# Patient Record
Sex: FEMALE | Race: WHITE | NOT HISPANIC OR LATINO | Employment: OTHER | URBAN - METROPOLITAN AREA
[De-identification: names, ages, dates, MRNs, and addresses within clinical notes are randomized per-mention and may not be internally consistent; named-entity substitution may affect disease eponyms.]

---

## 2017-02-02 PROBLEM — E66.09 NON MORBID OBESITY DUE TO EXCESS CALORIES: Status: ACTIVE | Noted: 2017-02-02

## 2017-05-02 PROBLEM — N18.2 CKD (CHRONIC KIDNEY DISEASE), STAGE II: Status: ACTIVE | Noted: 2017-05-02

## 2018-06-18 NOTE — NURSING
Total Joint Replacement Questionnaire     Lakesha Lorenzana participated in Joint Class June 9 1. Have you ever had a Joint Replacement?   [x]Yes  [] No    2. How many stairs/steps do you have to enter your home? 5  When going up, on what side is the railing?  [] Left  [] Right  [x] Bilateral  [] None    3. Do you own any Durable Medical Equipment?   [] Rolling Walker  [] Standard Walker  [] Rollator  [x] Cane  [] Crutches  [] Bed Side Commode  [] Hip Kit  [x] Tub Transfer Bench  [] Shower Chair     4. Have you used a Home Health Company before?   [] Yes  [x] No  If Yes, Name of Company: na  Would you like to use this company again?   [] Yes  [] No  [x] Would you like to use your physician's preference?  [x] Yes  [] No    5. Have you arranged for someone to help you, for at least for 3 days, when you are discharged from the hospital?  [x] Yes  [] No  If Yes, Name(s) of person assisting: family      Norma Stinson  6/18/2018

## 2018-06-19 ENCOUNTER — ANESTHESIA EVENT (OUTPATIENT)
Dept: SURGERY | Facility: OTHER | Age: 78
DRG: 470 | End: 2018-06-19
Payer: MEDICARE

## 2018-06-19 ENCOUNTER — HOSPITAL ENCOUNTER (OUTPATIENT)
Dept: PREADMISSION TESTING | Facility: OTHER | Age: 78
Discharge: HOME | End: 2018-06-19
Attending: ORTHOPAEDIC SURGERY
Payer: MEDICARE

## 2018-06-19 VITALS
OXYGEN SATURATION: 96 % | DIASTOLIC BLOOD PRESSURE: 78 MMHG | BODY MASS INDEX: 39.27 KG/M2 | WEIGHT: 230 LBS | HEIGHT: 64 IN | TEMPERATURE: 98 F | HEART RATE: 79 BPM | SYSTOLIC BLOOD PRESSURE: 174 MMHG

## 2018-06-19 DIAGNOSIS — M16.12 PRIMARY OSTEOARTHRITIS OF LEFT HIP: Primary | ICD-10-CM

## 2018-06-19 LAB
ABO + RH BLD: NORMAL
ALBUMIN SERPL BCP-MCNC: 4 G/DL
ALP SERPL-CCNC: 59 U/L
ALT SERPL W/O P-5'-P-CCNC: 12 U/L
ANION GAP SERPL CALC-SCNC: 15 MMOL/L
AST SERPL-CCNC: 12 U/L
BASOPHILS # BLD AUTO: 0.03 K/UL
BASOPHILS NFR BLD: 0.3 %
BILIRUB SERPL-MCNC: 0.4 MG/DL
BILIRUB UR QL STRIP: NEGATIVE
BLD GP AB SCN CELLS X3 SERPL QL: NORMAL
BUN SERPL-MCNC: 32 MG/DL
CALCIUM SERPL-MCNC: 10.7 MG/DL
CHLORIDE SERPL-SCNC: 101 MMOL/L
CLARITY UR: CLEAR
CO2 SERPL-SCNC: 25 MMOL/L
COLOR UR: YELLOW
CREAT SERPL-MCNC: 0.9 MG/DL
DIFFERENTIAL METHOD: NORMAL
EOSINOPHIL # BLD AUTO: 0.1 K/UL
EOSINOPHIL NFR BLD: 0.5 %
ERYTHROCYTE [DISTWIDTH] IN BLOOD BY AUTOMATED COUNT: 14.5 %
EST. GFR  (AFRICAN AMERICAN): >60 ML/MIN/1.73 M^2
EST. GFR  (NON AFRICAN AMERICAN): >60 ML/MIN/1.73 M^2
GLUCOSE SERPL-MCNC: 65 MG/DL
GLUCOSE UR QL STRIP: NEGATIVE
HCT VFR BLD AUTO: 39.6 %
HGB BLD-MCNC: 12.8 G/DL
HGB UR QL STRIP: NEGATIVE
KETONES UR QL STRIP: ABNORMAL
LEUKOCYTE ESTERASE UR QL STRIP: NEGATIVE
LYMPHOCYTES # BLD AUTO: 2.6 K/UL
LYMPHOCYTES NFR BLD: 23.8 %
MCH RBC QN AUTO: 30.3 PG
MCHC RBC AUTO-ENTMCNC: 32.3 G/DL
MCV RBC AUTO: 94 FL
MONOCYTES # BLD AUTO: 0.7 K/UL
MONOCYTES NFR BLD: 5.9 %
NEUTROPHILS # BLD AUTO: 7.6 K/UL
NEUTROPHILS NFR BLD: 69.3 %
NITRITE UR QL STRIP: NEGATIVE
PH UR STRIP: 6 [PH] (ref 5–8)
PLATELET # BLD AUTO: 269 K/UL
PMV BLD AUTO: 10.7 FL
POTASSIUM SERPL-SCNC: 4.1 MMOL/L
PROT SERPL-MCNC: 7.4 G/DL
PROT UR QL STRIP: ABNORMAL
RBC # BLD AUTO: 4.22 M/UL
SODIUM SERPL-SCNC: 141 MMOL/L
SP GR UR STRIP: >=1.03 (ref 1–1.03)
URN SPEC COLLECT METH UR: ABNORMAL
UROBILINOGEN UR STRIP-ACNC: NEGATIVE EU/DL
WBC # BLD AUTO: 11 K/UL

## 2018-06-19 PROCEDURE — 87086 URINE CULTURE/COLONY COUNT: CPT

## 2018-06-19 PROCEDURE — 86850 RBC ANTIBODY SCREEN: CPT

## 2018-06-19 PROCEDURE — 85025 COMPLETE CBC W/AUTO DIFF WBC: CPT

## 2018-06-19 PROCEDURE — 93005 ELECTROCARDIOGRAM TRACING: CPT

## 2018-06-19 PROCEDURE — 36415 COLL VENOUS BLD VENIPUNCTURE: CPT

## 2018-06-19 PROCEDURE — 93010 ELECTROCARDIOGRAM REPORT: CPT | Mod: ,,, | Performed by: INTERNAL MEDICINE

## 2018-06-19 PROCEDURE — 80053 COMPREHEN METABOLIC PANEL: CPT

## 2018-06-19 PROCEDURE — 81003 URINALYSIS AUTO W/O SCOPE: CPT

## 2018-06-19 RX ORDER — SODIUM CHLORIDE, SODIUM LACTATE, POTASSIUM CHLORIDE, CALCIUM CHLORIDE 600; 310; 30; 20 MG/100ML; MG/100ML; MG/100ML; MG/100ML
INJECTION, SOLUTION INTRAVENOUS CONTINUOUS
Status: CANCELLED | OUTPATIENT
Start: 2018-06-19

## 2018-06-19 RX ORDER — LIDOCAINE HYDROCHLORIDE 10 MG/ML
0.5 INJECTION, SOLUTION EPIDURAL; INFILTRATION; INTRACAUDAL; PERINEURAL ONCE
Status: CANCELLED | OUTPATIENT
Start: 2018-06-19 | End: 2018-06-19

## 2018-06-19 RX ORDER — PREGABALIN 75 MG/1
75 CAPSULE ORAL ONCE
Status: CANCELLED | OUTPATIENT
Start: 2018-06-19 | End: 2018-06-19

## 2018-06-19 RX ORDER — METFORMIN HYDROCHLORIDE 1000 MG/1
1000 TABLET ORAL 2 TIMES DAILY WITH MEALS
COMMUNITY

## 2018-06-19 NOTE — ANESTHESIA PREPROCEDURE EVALUATION
06/19/2018  Lakesha Lorenzana is a 77 y.o., female.    Anesthesia Evaluation    I have reviewed the Patient Summary Reports.    I have reviewed the Nursing Notes.   I have reviewed the Medications.     Review of Systems  Anesthesia Hx:  No problems with previous Anesthesia  Denies Family Hx of Anesthesia complications.   Denies Personal Hx of Anesthesia complications.   Social:  Non-Smoker    Cardiovascular:   Hypertension ECG has been reviewed. NSR,LVH on EKG   Renal/:   Chronic Renal Disease    Musculoskeletal:   Arthritis     Endocrine:   Diabetes        Physical Exam  General:  Well nourished      Dental:  Dental Findings: upper partial dentures        Mental Status:  Mental Status Findings:  Cooperative, Alert and Oriented         Anesthesia Plan  Type of Anesthesia, risks & benefits discussed:  Anesthesia Type:  spinal  Patient's Preference:   Intra-op Monitoring Plan: standard ASA monitors  Intra-op Monitoring Plan Comments:   Post Op Pain Control Plan: multimodal analgesia  Post Op Pain Control Plan Comments:   Induction:   IV  Beta Blocker:         Informed Consent: Patient understands risks and agrees with Anesthesia plan.  Questions answered. Anesthesia consent signed with patient.  ASA Score: 3     Day of Surgery Review of History & Physical:    H&P update referred to the surgeon.     Anesthesia Plan Notes: Pt doesn't want to hear anything        Ready For Surgery From Anesthesia Perspective.

## 2018-06-19 NOTE — DISCHARGE INSTRUCTIONS
PRE-ADMIT TESTING -  [phone]    [address]  [facility]          Your surgery has been scheduled at [facility]. We are pleased to have the opportunity to serve you. For Further Information please call [phone].    On the day of surgery please report to the Information Desk on the 1st floor.    · CONTACT YOUR PHYSICIAN'S OFFICE THE DAY PRIOR TO YOUR SURGERY TO OBTAIN YOUR ARRIVAL TIME.     · The evening before surgery do not eat anything after 9 p.m. ( this includes hard candy, chewing gum and mints).  You may only have GATORADE, POWERADE AND WATER  from 9 p.m. until you leave your home.   DO NOT DRINK ANY LIQUIDS ON THE WAY TO THE HOSPITAL.      SPECIAL MEDICATION INSTRUCTIONS: TAKE medications checked off by the Anesthesiologist on your Medication List.    Angiogram Patients: Take medications as instructed by your physician, including aspirin.     Surgery Patients:    If you take ASPIRIN - Your PHYSICIAN/SURGEON will need to inform you IF/OR when you need to stop taking aspirin prior to your surgery.     Do Not take any medications containing IBUPROFEN.  Do Not Wear any make-up or dark nail polish   (especially eye make-up) to surgery. If you come to surgery with makeup on you will be required to remove the makeup or nail polish.    Do not shave your surgical area at least 5 days prior to your surgery. The surgical prep will be performed at the hospital according to Infection Control regulations.    Leave all valuables at home.   Do Not wear any jewelry or watches, including any metal in body piercings.  Contact Lens must be removed before surgery. Either do not wear the contact lens or bring a case and solution for storage.  Please bring a container for eyeglasses or dentures as required.  Bring any paperwork your physician has provided, such as consent forms,  history and physicals, doctor's orders, etc.   Bring comfortable clothes that are loose fitting to wear upon  discharge. Take into consideration the type of surgery being performed.  Maintain your diet as advised per your physician the day prior to surgery.      Adequate rest the night before surgery is advised.   Park in the Parking lot behind the hospital or in the [facility] across the street from the parking lot. Parking is complimentary.  If you will be discharged the same day as your procedure, please arrange for a responsible adult to drive you home or to accompany you if traveling by taxi.   YOU WILL NOT BE PERMITTED TO DRIVE OR TO LEAVE THE HOSPITAL ALONE AFTER SURGERY.   It is strongly recommended that you arrange for someone to remain with you for the first 24 hrs following your surgery.       Thank you for your cooperation.  The Staff of [facility].        Bathing Instructions                                                                 Please shower the evening before and morning of your procedure with    ANTIBACTERIAL SOAP. ( DIAL, etc )  Concentrate on the surgical area   for at least 3 minutes and rinse completely. Dry off as usual.   Do not use any deodorant, powder, body lotions, perfume, after shave or    cologne.

## 2018-06-20 LAB
BACTERIA UR CULT: NORMAL
BACTERIA UR CULT: NORMAL

## 2018-06-29 ENCOUNTER — ANESTHESIA (OUTPATIENT)
Dept: SURGERY | Facility: OTHER | Age: 78
DRG: 470 | End: 2018-06-29
Payer: MEDICARE

## 2018-06-29 ENCOUNTER — HOSPITAL ENCOUNTER (INPATIENT)
Facility: OTHER | Age: 78
LOS: 2 days | Discharge: HOME HEALTH | DRG: 470 | End: 2018-07-01
Attending: ORTHOPAEDIC SURGERY | Admitting: ORTHOPAEDIC SURGERY
Payer: MEDICARE

## 2018-06-29 DIAGNOSIS — M16.12 PRIMARY OSTEOARTHRITIS OF LEFT HIP: Primary | ICD-10-CM

## 2018-06-29 DIAGNOSIS — M16.12 PRIMARY LOCALIZED OSTEOARTHROSIS OF LEFT HIP: ICD-10-CM

## 2018-06-29 LAB
ESTIMATED AVG GLUCOSE: 114 MG/DL
HBA1C MFR BLD HPLC: 5.6 %
POCT GLUCOSE: 108 MG/DL (ref 70–110)
POCT GLUCOSE: 135 MG/DL (ref 70–110)
POCT GLUCOSE: 218 MG/DL (ref 70–110)

## 2018-06-29 PROCEDURE — 25000003 PHARM REV CODE 250: Performed by: ANESTHESIOLOGY

## 2018-06-29 PROCEDURE — 27201423 OPTIME MED/SURG SUP & DEVICES STERILE SUPPLY: Performed by: ORTHOPAEDIC SURGERY

## 2018-06-29 PROCEDURE — 36000711: Performed by: ORTHOPAEDIC SURGERY

## 2018-06-29 PROCEDURE — 36000710: Performed by: ORTHOPAEDIC SURGERY

## 2018-06-29 PROCEDURE — 71000039 HC RECOVERY, EACH ADD'L HOUR: Performed by: ORTHOPAEDIC SURGERY

## 2018-06-29 PROCEDURE — 63600175 PHARM REV CODE 636 W HCPCS: Performed by: ORTHOPAEDIC SURGERY

## 2018-06-29 PROCEDURE — 99900035 HC TECH TIME PER 15 MIN (STAT)

## 2018-06-29 PROCEDURE — 36415 COLL VENOUS BLD VENIPUNCTURE: CPT

## 2018-06-29 PROCEDURE — 97530 THERAPEUTIC ACTIVITIES: CPT

## 2018-06-29 PROCEDURE — 97161 PT EVAL LOW COMPLEX 20 MIN: CPT

## 2018-06-29 PROCEDURE — 63600175 PHARM REV CODE 636 W HCPCS: Performed by: NURSE ANESTHETIST, CERTIFIED REGISTERED

## 2018-06-29 PROCEDURE — 71000033 HC RECOVERY, INTIAL HOUR: Performed by: ORTHOPAEDIC SURGERY

## 2018-06-29 PROCEDURE — 94799 UNLISTED PULMONARY SVC/PX: CPT

## 2018-06-29 PROCEDURE — 25000003 PHARM REV CODE 250: Performed by: ORTHOPAEDIC SURGERY

## 2018-06-29 PROCEDURE — 83036 HEMOGLOBIN GLYCOSYLATED A1C: CPT

## 2018-06-29 PROCEDURE — 37000009 HC ANESTHESIA EA ADD 15 MINS: Performed by: ORTHOPAEDIC SURGERY

## 2018-06-29 PROCEDURE — 97116 GAIT TRAINING THERAPY: CPT

## 2018-06-29 PROCEDURE — 25000003 PHARM REV CODE 250: Performed by: NURSE PRACTITIONER

## 2018-06-29 PROCEDURE — 25000003 PHARM REV CODE 250: Performed by: NURSE ANESTHETIST, CERTIFIED REGISTERED

## 2018-06-29 PROCEDURE — 82962 GLUCOSE BLOOD TEST: CPT | Performed by: ORTHOPAEDIC SURGERY

## 2018-06-29 PROCEDURE — 63600175 PHARM REV CODE 636 W HCPCS: Performed by: SPECIALIST

## 2018-06-29 PROCEDURE — C1713 ANCHOR/SCREW BN/BN,TIS/BN: HCPCS | Performed by: ORTHOPAEDIC SURGERY

## 2018-06-29 PROCEDURE — 37000008 HC ANESTHESIA 1ST 15 MINUTES: Performed by: ORTHOPAEDIC SURGERY

## 2018-06-29 PROCEDURE — 94761 N-INVAS EAR/PLS OXIMETRY MLT: CPT

## 2018-06-29 PROCEDURE — 11000001 HC ACUTE MED/SURG PRIVATE ROOM

## 2018-06-29 PROCEDURE — G8979 MOBILITY GOAL STATUS: HCPCS | Mod: CL

## 2018-06-29 PROCEDURE — S0020 INJECTION, BUPIVICAINE HYDRO: HCPCS | Performed by: ORTHOPAEDIC SURGERY

## 2018-06-29 PROCEDURE — C1776 JOINT DEVICE (IMPLANTABLE): HCPCS | Performed by: ORTHOPAEDIC SURGERY

## 2018-06-29 PROCEDURE — 0SRB02A REPLACEMENT OF LEFT HIP JOINT WITH METAL ON POLYETHYLENE SYNTHETIC SUBSTITUTE, UNCEMENTED, OPEN APPROACH: ICD-10-PCS | Performed by: ORTHOPAEDIC SURGERY

## 2018-06-29 PROCEDURE — G8978 MOBILITY CURRENT STATUS: HCPCS | Mod: CM

## 2018-06-29 PROCEDURE — C9290 INJ, BUPIVACAINE LIPOSOME: HCPCS | Performed by: ORTHOPAEDIC SURGERY

## 2018-06-29 DEVICE — HEAD FEMORAL 36MM: Type: IMPLANTABLE DEVICE | Site: HIP | Status: FUNCTIONAL

## 2018-06-29 DEVICE — LINER POLY 36MM: Type: IMPLANTABLE DEVICE | Site: HIP | Status: FUNCTIONAL

## 2018-06-29 DEVICE — STEM FEM PRIMARY HIP SYSTEM: Type: IMPLANTABLE DEVICE | Site: HIP | Status: FUNCTIONAL

## 2018-06-29 DEVICE — SCREW BONE 6.5X30 SELF-TAP: Type: IMPLANTABLE DEVICE | Site: HIP | Status: FUNCTIONAL

## 2018-06-29 DEVICE — SCREW BONE SELF TAP 6.5X40: Type: IMPLANTABLE DEVICE | Site: HIP | Status: FUNCTIONAL

## 2018-06-29 DEVICE — CUP SHELL TM MOD W/CLUST 50MM: Type: IMPLANTABLE DEVICE | Site: HIP | Status: FUNCTIONAL

## 2018-06-29 RX ORDER — HYDROMORPHONE HYDROCHLORIDE 2 MG/ML
0.5 INJECTION, SOLUTION INTRAMUSCULAR; INTRAVENOUS; SUBCUTANEOUS EVERY 4 HOURS PRN
Status: DISCONTINUED | OUTPATIENT
Start: 2018-06-29 | End: 2018-07-01 | Stop reason: HOSPADM

## 2018-06-29 RX ORDER — SODIUM CHLORIDE, SODIUM LACTATE, POTASSIUM CHLORIDE, CALCIUM CHLORIDE 600; 310; 30; 20 MG/100ML; MG/100ML; MG/100ML; MG/100ML
INJECTION, SOLUTION INTRAVENOUS CONTINUOUS
Status: DISCONTINUED | OUTPATIENT
Start: 2018-06-29 | End: 2018-06-29

## 2018-06-29 RX ORDER — OXYCODONE HYDROCHLORIDE 5 MG/1
5 TABLET ORAL
Status: DISCONTINUED | OUTPATIENT
Start: 2018-06-29 | End: 2018-06-29 | Stop reason: HOSPADM

## 2018-06-29 RX ORDER — MEPERIDINE HYDROCHLORIDE 50 MG/ML
12.5 INJECTION INTRAMUSCULAR; INTRAVENOUS; SUBCUTANEOUS ONCE AS NEEDED
Status: DISCONTINUED | OUTPATIENT
Start: 2018-06-29 | End: 2018-06-29 | Stop reason: HOSPADM

## 2018-06-29 RX ORDER — EPHEDRINE SULFATE 50 MG/ML
INJECTION, SOLUTION INTRAVENOUS
Status: DISCONTINUED | OUTPATIENT
Start: 2018-06-29 | End: 2018-06-29

## 2018-06-29 RX ORDER — ONDANSETRON 2 MG/ML
INJECTION INTRAMUSCULAR; INTRAVENOUS
Status: DISCONTINUED | OUTPATIENT
Start: 2018-06-29 | End: 2018-06-29

## 2018-06-29 RX ORDER — FAMOTIDINE 20 MG/1
20 TABLET, FILM COATED ORAL 2 TIMES DAILY
Status: DISCONTINUED | OUTPATIENT
Start: 2018-06-29 | End: 2018-07-01 | Stop reason: HOSPADM

## 2018-06-29 RX ORDER — LIDOCAINE HYDROCHLORIDE 10 MG/ML
0.5 INJECTION, SOLUTION EPIDURAL; INFILTRATION; INTRACAUDAL; PERINEURAL ONCE
Status: DISCONTINUED | OUTPATIENT
Start: 2018-06-29 | End: 2018-06-29 | Stop reason: HOSPADM

## 2018-06-29 RX ORDER — DIPHENHYDRAMINE HYDROCHLORIDE 50 MG/ML
25 INJECTION INTRAMUSCULAR; INTRAVENOUS EVERY 6 HOURS PRN
Status: DISCONTINUED | OUTPATIENT
Start: 2018-06-29 | End: 2018-07-01 | Stop reason: HOSPADM

## 2018-06-29 RX ORDER — IBUPROFEN 200 MG
24 TABLET ORAL
Status: DISCONTINUED | OUTPATIENT
Start: 2018-06-29 | End: 2018-07-01 | Stop reason: HOSPADM

## 2018-06-29 RX ORDER — CELECOXIB 200 MG/1
200 CAPSULE ORAL 2 TIMES DAILY
Status: DISCONTINUED | OUTPATIENT
Start: 2018-06-29 | End: 2018-06-29

## 2018-06-29 RX ORDER — BISACODYL 10 MG
10 SUPPOSITORY, RECTAL RECTAL DAILY PRN
Status: DISCONTINUED | OUTPATIENT
Start: 2018-06-29 | End: 2018-07-01 | Stop reason: HOSPADM

## 2018-06-29 RX ORDER — MIDAZOLAM HYDROCHLORIDE 1 MG/ML
INJECTION INTRAMUSCULAR; INTRAVENOUS
Status: DISCONTINUED | OUTPATIENT
Start: 2018-06-29 | End: 2018-06-29

## 2018-06-29 RX ORDER — KETOROLAC TROMETHAMINE 30 MG/ML
INJECTION, SOLUTION INTRAMUSCULAR; INTRAVENOUS
Status: DISCONTINUED | OUTPATIENT
Start: 2018-06-29 | End: 2018-06-29 | Stop reason: HOSPADM

## 2018-06-29 RX ORDER — AMLODIPINE BESYLATE 5 MG/1
10 TABLET ORAL DAILY
Status: DISCONTINUED | OUTPATIENT
Start: 2018-06-29 | End: 2018-07-01 | Stop reason: HOSPADM

## 2018-06-29 RX ORDER — GLUCAGON 1 MG
1 KIT INJECTION
Status: DISCONTINUED | OUTPATIENT
Start: 2018-06-29 | End: 2018-07-01 | Stop reason: HOSPADM

## 2018-06-29 RX ORDER — OXYCODONE AND ACETAMINOPHEN 5; 325 MG/1; MG/1
TABLET ORAL
Qty: 60 TABLET | Refills: 0 | Status: SHIPPED | OUTPATIENT
Start: 2018-06-29

## 2018-06-29 RX ORDER — TRANEXAMIC ACID 100 MG/ML
INJECTION, SOLUTION INTRAVENOUS
Status: DISCONTINUED | OUTPATIENT
Start: 2018-06-29 | End: 2018-06-29

## 2018-06-29 RX ORDER — FENTANYL CITRATE 50 UG/ML
25 INJECTION, SOLUTION INTRAMUSCULAR; INTRAVENOUS EVERY 5 MIN PRN
Status: DISCONTINUED | OUTPATIENT
Start: 2018-06-29 | End: 2018-06-29 | Stop reason: HOSPADM

## 2018-06-29 RX ORDER — ASPIRIN 325 MG
325 TABLET ORAL EVERY 12 HOURS
Status: DISCONTINUED | OUTPATIENT
Start: 2018-06-30 | End: 2018-07-01 | Stop reason: HOSPADM

## 2018-06-29 RX ORDER — BUPIVACAINE HYDROCHLORIDE 2.5 MG/ML
INJECTION, SOLUTION INFILTRATION; PERINEURAL
Status: DISCONTINUED | OUTPATIENT
Start: 2018-06-29 | End: 2018-06-29 | Stop reason: HOSPADM

## 2018-06-29 RX ORDER — ATORVASTATIN CALCIUM 10 MG/1
10 TABLET, FILM COATED ORAL DAILY
Status: DISCONTINUED | OUTPATIENT
Start: 2018-06-29 | End: 2018-07-01 | Stop reason: HOSPADM

## 2018-06-29 RX ORDER — SODIUM CHLORIDE 0.9 % (FLUSH) 0.9 %
5 SYRINGE (ML) INJECTION
Status: DISCONTINUED | OUTPATIENT
Start: 2018-06-29 | End: 2018-07-01 | Stop reason: HOSPADM

## 2018-06-29 RX ORDER — OXYCODONE HYDROCHLORIDE 5 MG/1
5 TABLET ORAL EVERY 4 HOURS PRN
Status: DISCONTINUED | OUTPATIENT
Start: 2018-06-29 | End: 2018-07-01 | Stop reason: HOSPADM

## 2018-06-29 RX ORDER — PREGABALIN 75 MG/1
75 CAPSULE ORAL ONCE
Status: COMPLETED | OUTPATIENT
Start: 2018-06-29 | End: 2018-06-29

## 2018-06-29 RX ORDER — BACITRACIN 50000 [IU]/1
INJECTION, POWDER, FOR SOLUTION INTRAMUSCULAR
Status: DISCONTINUED | OUTPATIENT
Start: 2018-06-29 | End: 2018-06-29 | Stop reason: HOSPADM

## 2018-06-29 RX ORDER — CEFAZOLIN SODIUM 1 G/3ML
2 INJECTION, POWDER, FOR SOLUTION INTRAMUSCULAR; INTRAVENOUS
Status: COMPLETED | OUTPATIENT
Start: 2018-06-29 | End: 2018-06-29

## 2018-06-29 RX ORDER — DIPHENHYDRAMINE HYDROCHLORIDE 50 MG/ML
25 INJECTION INTRAMUSCULAR; INTRAVENOUS EVERY 6 HOURS PRN
Status: DISCONTINUED | OUTPATIENT
Start: 2018-06-29 | End: 2018-06-29 | Stop reason: HOSPADM

## 2018-06-29 RX ORDER — MUPIROCIN 20 MG/G
1 OINTMENT TOPICAL 2 TIMES DAILY
Status: DISCONTINUED | OUTPATIENT
Start: 2018-06-29 | End: 2018-07-01 | Stop reason: HOSPADM

## 2018-06-29 RX ORDER — ONDANSETRON 2 MG/ML
4 INJECTION INTRAMUSCULAR; INTRAVENOUS DAILY PRN
Status: DISCONTINUED | OUTPATIENT
Start: 2018-06-29 | End: 2018-06-29 | Stop reason: HOSPADM

## 2018-06-29 RX ORDER — SODIUM CHLORIDE, SODIUM LACTATE, POTASSIUM CHLORIDE, CALCIUM CHLORIDE 600; 310; 30; 20 MG/100ML; MG/100ML; MG/100ML; MG/100ML
INJECTION, SOLUTION INTRAVENOUS CONTINUOUS PRN
Status: DISCONTINUED | OUTPATIENT
Start: 2018-06-29 | End: 2018-06-29

## 2018-06-29 RX ORDER — HYDROMORPHONE HYDROCHLORIDE 2 MG/ML
INJECTION, SOLUTION INTRAMUSCULAR; INTRAVENOUS; SUBCUTANEOUS
Status: DISPENSED
Start: 2018-06-29 | End: 2018-06-29

## 2018-06-29 RX ORDER — ACETAMINOPHEN 10 MG/ML
1000 INJECTION, SOLUTION INTRAVENOUS EVERY 8 HOURS
Status: COMPLETED | OUTPATIENT
Start: 2018-06-29 | End: 2018-06-30

## 2018-06-29 RX ORDER — HYDROMORPHONE HYDROCHLORIDE 2 MG/ML
0.4 INJECTION, SOLUTION INTRAMUSCULAR; INTRAVENOUS; SUBCUTANEOUS EVERY 5 MIN PRN
Status: DISCONTINUED | OUTPATIENT
Start: 2018-06-29 | End: 2018-06-29 | Stop reason: HOSPADM

## 2018-06-29 RX ORDER — PROPOFOL 10 MG/ML
VIAL (ML) INTRAVENOUS CONTINUOUS PRN
Status: DISCONTINUED | OUTPATIENT
Start: 2018-06-29 | End: 2018-06-29

## 2018-06-29 RX ORDER — INSULIN ASPART 100 [IU]/ML
1-10 INJECTION, SOLUTION INTRAVENOUS; SUBCUTANEOUS
Status: DISCONTINUED | OUTPATIENT
Start: 2018-06-29 | End: 2018-07-01 | Stop reason: HOSPADM

## 2018-06-29 RX ORDER — IBUPROFEN 200 MG
16 TABLET ORAL
Status: DISCONTINUED | OUTPATIENT
Start: 2018-06-29 | End: 2018-07-01 | Stop reason: HOSPADM

## 2018-06-29 RX ORDER — ACETAMINOPHEN 10 MG/ML
1000 INJECTION, SOLUTION INTRAVENOUS
Status: COMPLETED | OUTPATIENT
Start: 2018-06-29 | End: 2018-06-29

## 2018-06-29 RX ORDER — DEXTROSE MONOHYDRATE AND SODIUM CHLORIDE 5; .9 G/100ML; G/100ML
INJECTION, SOLUTION INTRAVENOUS CONTINUOUS
Status: DISCONTINUED | OUTPATIENT
Start: 2018-06-29 | End: 2018-06-29

## 2018-06-29 RX ORDER — ONDANSETRON 2 MG/ML
4 INJECTION INTRAMUSCULAR; INTRAVENOUS EVERY 12 HOURS PRN
Status: DISCONTINUED | OUTPATIENT
Start: 2018-06-29 | End: 2018-07-01 | Stop reason: HOSPADM

## 2018-06-29 RX ORDER — LIDOCAINE HCL/PF 100 MG/5ML
SYRINGE (ML) INTRAVENOUS
Status: DISCONTINUED | OUTPATIENT
Start: 2018-06-29 | End: 2018-06-29

## 2018-06-29 RX ORDER — ROPIVACAINE HYDROCHLORIDE 5 MG/ML
INJECTION, SOLUTION EPIDURAL; INFILTRATION; PERINEURAL
Status: DISCONTINUED | OUTPATIENT
Start: 2018-06-29 | End: 2018-06-29

## 2018-06-29 RX ORDER — SODIUM CHLORIDE 0.9 % (FLUSH) 0.9 %
3 SYRINGE (ML) INJECTION
Status: DISCONTINUED | OUTPATIENT
Start: 2018-06-29 | End: 2018-07-01 | Stop reason: HOSPADM

## 2018-06-29 RX ORDER — DOXAZOSIN 2 MG/1
2 TABLET ORAL DAILY
Status: DISCONTINUED | OUTPATIENT
Start: 2018-06-29 | End: 2018-07-01 | Stop reason: HOSPADM

## 2018-06-29 RX ORDER — CEFAZOLIN SODIUM 2 G/50ML
2 SOLUTION INTRAVENOUS
Status: COMPLETED | OUTPATIENT
Start: 2018-06-29 | End: 2018-06-30

## 2018-06-29 RX ORDER — POLYETHYLENE GLYCOL 3350 17 G/17G
17 POWDER, FOR SOLUTION ORAL DAILY
Status: DISCONTINUED | OUTPATIENT
Start: 2018-06-29 | End: 2018-07-01 | Stop reason: HOSPADM

## 2018-06-29 RX ORDER — VANCOMYCIN HYDROCHLORIDE
15
Status: COMPLETED | OUTPATIENT
Start: 2018-06-29 | End: 2018-06-29

## 2018-06-29 RX ORDER — DOCUSATE SODIUM 100 MG/1
100 CAPSULE, LIQUID FILLED ORAL EVERY 12 HOURS
Status: DISCONTINUED | OUTPATIENT
Start: 2018-06-29 | End: 2018-07-01 | Stop reason: HOSPADM

## 2018-06-29 RX ORDER — OXYCODONE HYDROCHLORIDE 5 MG/1
10 TABLET ORAL EVERY 4 HOURS PRN
Status: DISCONTINUED | OUTPATIENT
Start: 2018-06-29 | End: 2018-07-01 | Stop reason: HOSPADM

## 2018-06-29 RX ADMIN — DOXAZOSIN MESYLATE 2 MG: 2 TABLET ORAL at 04:06

## 2018-06-29 RX ADMIN — ONDANSETRON 4 MG: 2 INJECTION INTRAMUSCULAR; INTRAVENOUS at 08:06

## 2018-06-29 RX ADMIN — ONDANSETRON HYDROCHLORIDE 4 MG: 2 INJECTION, SOLUTION INTRAMUSCULAR; INTRAVENOUS at 01:06

## 2018-06-29 RX ADMIN — ACETAMINOPHEN 1000 MG: 10 INJECTION, SOLUTION INTRAVENOUS at 04:06

## 2018-06-29 RX ADMIN — EPHEDRINE SULFATE 10 MG: 50 INJECTION INTRAMUSCULAR; INTRAVENOUS; SUBCUTANEOUS at 07:06

## 2018-06-29 RX ADMIN — DOCUSATE SODIUM 100 MG: 100 CAPSULE, LIQUID FILLED ORAL at 09:06

## 2018-06-29 RX ADMIN — ACETAMINOPHEN 1000 MG: 10 INJECTION, SOLUTION INTRAVENOUS at 09:06

## 2018-06-29 RX ADMIN — SODIUM CHLORIDE, SODIUM LACTATE, POTASSIUM CHLORIDE, AND CALCIUM CHLORIDE: 600; 310; 30; 20 INJECTION, SOLUTION INTRAVENOUS at 06:06

## 2018-06-29 RX ADMIN — DOCUSATE SODIUM 100 MG: 100 CAPSULE, LIQUID FILLED ORAL at 11:06

## 2018-06-29 RX ADMIN — Medication 1500 MG: at 05:06

## 2018-06-29 RX ADMIN — MUPIROCIN 1 G: 20 OINTMENT TOPICAL at 09:06

## 2018-06-29 RX ADMIN — PREGABALIN 75 MG: 75 CAPSULE ORAL at 05:06

## 2018-06-29 RX ADMIN — PROPOFOL 50 MCG/KG/MIN: 10 INJECTION, EMULSION INTRAVENOUS at 07:06

## 2018-06-29 RX ADMIN — MUPIROCIN 1 G: 20 OINTMENT TOPICAL at 11:06

## 2018-06-29 RX ADMIN — FAMOTIDINE 20 MG: 20 TABLET ORAL at 11:06

## 2018-06-29 RX ADMIN — TRANEXAMIC ACID 2000 MG: 100 INJECTION, SOLUTION INTRAVENOUS at 07:06

## 2018-06-29 RX ADMIN — OXYCODONE HYDROCHLORIDE 10 MG: 5 TABLET ORAL at 09:06

## 2018-06-29 RX ADMIN — CEFAZOLIN 2 G: 330 INJECTION, POWDER, FOR SOLUTION INTRAMUSCULAR; INTRAVENOUS at 07:06

## 2018-06-29 RX ADMIN — ROPIVACAINE HYDROCHLORIDE 3 ML: 5 INJECTION, SOLUTION EPIDURAL; INFILTRATION; PERINEURAL at 06:06

## 2018-06-29 RX ADMIN — OXYCODONE HYDROCHLORIDE 5 MG: 5 TABLET ORAL at 01:06

## 2018-06-29 RX ADMIN — LIDOCAINE HYDROCHLORIDE 50 MG: 20 INJECTION, SOLUTION INTRAVENOUS at 07:06

## 2018-06-29 RX ADMIN — VANCOMYCIN HYDROCHLORIDE 1500 MG: 1 INJECTION, POWDER, LYOPHILIZED, FOR SOLUTION INTRAVENOUS at 06:06

## 2018-06-29 RX ADMIN — FAMOTIDINE 20 MG: 20 TABLET ORAL at 09:06

## 2018-06-29 RX ADMIN — SODIUM CHLORIDE, SODIUM LACTATE, POTASSIUM CHLORIDE, AND CALCIUM CHLORIDE: 600; 310; 30; 20 INJECTION, SOLUTION INTRAVENOUS at 07:06

## 2018-06-29 RX ADMIN — POLYETHYLENE GLYCOL 3350 17 G: 17 POWDER, FOR SOLUTION ORAL at 11:06

## 2018-06-29 RX ADMIN — ACETAMINOPHEN 1000 MG: 10 INJECTION, SOLUTION INTRAVENOUS at 07:06

## 2018-06-29 RX ADMIN — ATORVASTATIN CALCIUM 10 MG: 10 TABLET, FILM COATED ORAL at 04:06

## 2018-06-29 RX ADMIN — AMLODIPINE BESYLATE 10 MG: 5 TABLET ORAL at 04:06

## 2018-06-29 RX ADMIN — MIDAZOLAM HYDROCHLORIDE 2 MG: 1 INJECTION, SOLUTION INTRAMUSCULAR; INTRAVENOUS at 06:06

## 2018-06-29 RX ADMIN — HYDROMORPHONE HYDROCHLORIDE 0.5 MG: 2 INJECTION INTRAMUSCULAR; INTRAVENOUS; SUBCUTANEOUS at 10:06

## 2018-06-29 RX ADMIN — CEFAZOLIN SODIUM 2 G: 2 SOLUTION INTRAVENOUS at 04:06

## 2018-06-29 NOTE — ANESTHESIA PROCEDURE NOTES
Spinal    Diagnosis: L Hip  Patient location during procedure: holding area  Start time: 6/29/2018 6:40 AM  Timeout: 6/29/2018 6:40 AM  End time: 6/29/2018 6:45 AM  Staffing  Anesthesiologist: MIMI MONSIVAIS  Performed: anesthesiologist   Preanesthetic Checklist  Completed: patient identified, site marked, surgical consent, pre-op evaluation, timeout performed, IV checked, risks and benefits discussed and monitors and equipment checked  Spinal Block  Patient position: sitting  Prep: ChloraPrep  Patient monitoring: heart rate, cardiac monitor and continuous pulse ox  Approach: right paramedian  Location: L3-4  Injection technique: single shot  CSF Fluid: clear free-flowing CSF  Needle  Needle type: pencil-tip   Needle gauge: 25 G  Needle length: 3.5 in  Additional Documentation: incremental injection, negative aspiration for heme and no paresthesia on injection  Needle localization: anatomical landmarks  Assessment  Sensory level: T4   Dermatomal levels determined by alcohol wipe and pinch or prick  Ease of block: easy  Patient's tolerance of the procedure: comfortable throughout block and no complaints  Medications:  Bolus administered: 3 mL of 0.5 ropivacaine  Epinephrine added: none

## 2018-06-29 NOTE — OR NURSING
Family updated on pt status and given rom #[id number], room being cleaned, will contact family when room ready.

## 2018-06-29 NOTE — PLAN OF CARE
SW met with pt at bedside to complete discharge assessment, verified PCP and uses [facility] pharmacy on [address].  Daughter will provide transportation home.  Pt will discharge to Bethany goss's home:  [address] [place] [id number].  Pt needs RW, BSC and hip kit, long and would like to be placed with [facility] and signed choice form.  Also requested light weight WC, heavy duty.     06/29/18 1509   Discharge Assessment   Assessment Type Discharge Planning Assessment   Confirmed/corrected address and phone number on facesheet? Yes   Assessment information obtained from? Patient   Communicated expected length of stay with patient/caregiver no   Prior to hospitilization cognitive status: Alert/Oriented   Prior to hospitalization functional status: Independent   Current cognitive status: Alert/Oriented   Current Functional Status: Assistive Equipment;Needs Assistance   Lives With spouse   Able to Return to Prior Arrangements yes   Is patient able to care for self after discharge? Unable to determine at this time (comments)   Who are your caregiver(s) and their phone number(s)? (Bethany, daughter, [phone])   Patient's perception of discharge disposition home health   Readmission Within The Last 30 Days no previous admission in last 30 days   Patient currently being followed by outpatient case management? No   Patient currently receives any other outside agency services? No   Equipment Currently Used at Home (transfer tub bench)   Do you have any problems affording any of your prescribed medications? No   Is the patient taking medications as prescribed? yes   Does the patient have transportation home? Yes   Transportation Available family or friend will provide   Discharge Plan A Home Health   Patient/Family In Agreement With Plan yes

## 2018-06-29 NOTE — PT/OT/SLP PROGRESS
Occupational Therapy  Not Seen    Lakesha Lorenzana   MRN: [id number]     Patient not seen for Occupational Therapy today due to ( ) departmental protocol for elective surgery patients.    Patient with Primary localized osteoarthrosis of left hip [M16.12], s/p Procedure(s):  ARTHROPLASTY, HIP TOTAL 6/29/2018 who will be seen for Occupational Therapy evaluation POD#1.    Fara Mireles, OT   6/29/2018

## 2018-06-29 NOTE — TRANSFER OF CARE
"Anesthesia Transfer of Care Note    Patient: Lakesha Lorenzana    Procedure(s) Performed: Procedure(s) (LRB):  ARTHROPLASTY, HIP TOTAL (Left)    Patient location: PACU    Anesthesia Type: spinal    Transport from OR: Transported from OR on 2-3 L/min O2 by NC with adequate spontaneous ventilation    Post pain: adequate analgesia    Post assessment: no apparent anesthetic complications    Post vital signs: stable    Level of consciousness: awake, alert and oriented    Nausea/Vomiting: no nausea/vomiting    Complications: none          Last vitals:   Visit Vitals  BP (!) 120/59 (BP Location: Right arm, Patient Position: Lying)   Pulse 75   Temp 36.3 °C (97.4 °F) (Oral)   Resp 16   Ht 5' 4" (1.626 m)   Wt 104.3 kg (230 lb)   SpO2 (!) 76%   Breastfeeding? No   BMI 39.48 kg/m²     "

## 2018-06-29 NOTE — ANESTHESIA POSTPROCEDURE EVALUATION
"Anesthesia Post Evaluation    Patient: Lakesha Lorenzana    Procedure(s) Performed: Procedure(s) (LRB):  ARTHROPLASTY, HIP TOTAL (Left)    Final Anesthesia Type: general  Patient location during evaluation: PACU  Patient participation: Yes- Able to Participate  Level of consciousness: awake and alert  Post-procedure vital signs: reviewed and stable  Pain management: adequate  Airway patency: patent  PONV status at discharge: No PONV  Anesthetic complications: no      Cardiovascular status: blood pressure returned to baseline  Respiratory status: unassisted and spontaneous ventilation  Hydration status: euvolemic  Follow-up not needed.        Visit Vitals  BP (!) 143/57   Pulse 83   Temp 36.4 °C (97.6 °F) (Oral)   Resp 16   Ht 5' 4" (1.626 m)   Wt 104.3 kg (230 lb)   SpO2 99%   Breastfeeding? No   BMI 39.48 kg/m²       Pain/Analliia Score: Pain Assessment Performed: Yes (6/29/2018 10:48 AM)  Presence of Pain: complains of pain/discomfort (6/29/2018 10:48 AM)  Pain Rating Prior to Med Admin: 5 (6/29/2018 10:48 AM)  Analilia Score: 10 (6/29/2018 10:48 AM)      "

## 2018-06-29 NOTE — PLAN OF CARE
Problem: Patient Care Overview  Goal: Plan of Care Review  Outcome: Ongoing (interventions implemented as appropriate)  Pt on RA. Sats 97%. No distress noted. IS done with good effort. Will continue to monitor.

## 2018-06-29 NOTE — CONSULTS
Consult Note  IM    Consult Requested By: Onel Hager MD  Reason for Consult: osteoarthritis, CKD 3, HTN, hyperlipidemia, DM, and PVD    SUBJECTIVE:     History of Present Illness:   77 y.o. female presents with a scheduled left hip repair. Denies CP, SOB,F,C N,V.  Daughter at bedside.    Past Medical History:   Diagnosis Date    Chronic kidney disease, stage III (moderate)     DJD (degenerative joint disease)     Dyslipidemia     Essential hypertension, benign     Obesity     PVD (peripheral vascular disease)     Type II or unspecified type diabetes mellitus without mention of complication, uncontrolled      Past Surgical History:   Procedure Laterality Date    EYE SURGERY      cataracts    glandular surgery      HYSTERECTOMY      TOTAL KNEE ARTHROPLASTY      left and right     Family History   Problem Relation Age of Onset    Diabetes Mother     Diabetes Sister     Diabetes Brother      Social History   Substance Use Topics    Smoking status: Former Smoker     Types: Cigarettes    Smokeless tobacco: Never Used    Alcohol use No       Review of patient's allergies indicates:   Allergen Reactions    Codeine     Latex, natural rubber Itching    Sulfa (sulfonamide antibiotics)         Review of Systems:  Constitutional: No fever or chills  Respiratory: No cough or shortness of breath  Cardiovascular: No chest pain or palpitations  Gastrointestinal: No nausea or vomiting  Neurological: No confusion or weakness    OBJECTIVE:     Vital Signs (Most Recent)  Temp: 98 °F (36.7 °C) (06/29/18 1118)  Pulse: 80 (06/29/18 1118)  Resp: 16 (06/29/18 1118)  BP: (!) 162/67 (06/29/18 1118)  SpO2: (!) 93 % (06/29/18 1118)    Vital Signs Range (Last 24H):  Temp:  [97.4 °F (36.3 °C)-98 °F (36.7 °C)]   Pulse:  [75-84]   Resp:  [16]   BP: (108-162)/(57-74)   SpO2:  [93 %-100 %]       Intake/Output Summary (Last 24 hours) at 06/29/18 1353  Last data filed at 06/29/18 1011   Gross per 24 hour   Intake              1700 ml   Output             1025 ml   Net              675 ml       Physical Exam:  General appearance: Well developed, well nourished  Eyes:  Conjunctivae/corneas clear. PERRL.  Lungs: Normal respiratory effort,   clear to auscultation bilaterally   Heart: Regular rate and rhythm, S1, S2 normal, no murmur, rub or luis.  Abdomen: Soft, non-tender non-distended; bowel sounds normal; no masses,  no organomegaly  Extremities: No cyanosis or clubbing. No edema.  +2 pulses BLE, abductor pillow in place  Skin: Skin color, texture, turgor normal. No rashes or lesions, Jamil dressing in place  Neurologic: Normal strength and tone. No focal numbness or weakness   Red      Laboratory:    Reviewed    Diagnostic Results:      ASSESSMENT/PLAN:     1. Left hip repair (E11.6): per therapy and ortho teams  2. PVD (I73.9): defer  3. 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 Hold parameters on BP medications  4. Hyperlipidemia (E78.5): statin  5. Vit D deficiency (E55.9): continue ergo at discharge.  6. DM T2 (E11.6): hold PO meds and use mod dose SSI  7. DVT prophy:  mg BID, MARITA and SCD per ortho    Plan: Thanks for consult, See above recommendations and orders. Will follow along.

## 2018-06-29 NOTE — OR NURSING
Left hip x-ray done @ bedside, pt tolerated well.    Daughter, Bethany, updated on pt status and wait for inpatient bed, verbalized understanding.

## 2018-06-29 NOTE — PT/OT/SLP EVAL
"Physical Therapy Evaluation/Treatment Session     Patient Name:  Lakesha Lorenzana   MRN:  [id number]    Recommendations:     Discharge Recommendations:   (TBD pending progress during acute therapy. )   Discharge Equipment Recommendations: bedside commode, bath bench   Barriers to discharge: None    Assessment:     Lakesha Lorenzana is a 77 y.o. female admitted with a medical diagnosis of Primary osteoarthritis of left hip.  She presents with the following impairments/functional limitations:  weakness, impaired endurance, impaired self care skills, impaired balance, gait instability, impaired functional mobilty, decreased lower extremity function, decreased ROM, pain, orthopedic precautions. Limited evaluation performed 2* to pt's decreased activity tolerance. Pt reported dizziness initially upon sitting EOB, which she reported worsened as she sat up. She was returned to supine with HOB elevated and nursing was notified. Post acute therapy recommendations to be decided after PM session or when pt has improved activity tolerance. Pt was able to correctly state 1/4 posterior hip precautions.     Rehab Prognosis:  good; patient would benefit from acute skilled PT services to address these deficits and reach maximum level of function.      Recent Surgery: Procedure(s) (LRB):  ARTHROPLASTY, HIP TOTAL (Left) Day of Surgery    Plan:     During this hospitalization, patient to be seen BID to address the above listed problems via gait training, therapeutic activities, therapeutic exercises  · Plan of Care Expires:  07/29/18   Plan of Care Reviewed with: patient, daughter    Subjective     Communicated with DEANDRE Peña prior to session.  Patient found supine with HOB elevated upon PT entry to room, agreeable to evaluation.      Chief Complaint: "I'm tired"   Patient comments/goals: improve pain.   Pain/Comfort:  Pain Rating 1: 5/10  Location - Side 1: Left  Location 1: groin  Pain Addressed 1: Pre-medicate for activity, " Reposition, Distraction  Pain Rating Post-Intervention 1: 8/10    Patients cultural, spiritual, Holiness conflicts given the current situation: None Verbalized to PT    Living Environment:  Pt lives with her  in a SSH with 5STE but will be discharging to her son and daughter in laws house with NSTE and a tub/shower. Pt denies any recent falls/near falls.  Prior to admission, patients level of function was independent.  Patient has the following equipment: walker, rolling.  DME owned (not currently used): none.  Upon discharge, patient will have assistance from family members.    Objective:     Patient found with: SCD (adductor wedge)     General Precautions: Standard, fall   Orthopedic Precautions:LLE weight bearing as tolerated, LLE posterior precautions   Braces: N/A     Exams:  · Cognitive Exam:  Patient is oriented to Person, Place, Time and Situation and follows 100% of all commands   · Sensation:    · -       Intact  · BLE ROM:  Unable to formally assess during limited eval 2* to decreased tolerance of the upright position.   · BLE Strength: Unable to formally assess during limited eval 2* to decreased tolerance of the upright position.     Functional Mobility:  · Bed Mobility:     · Supine to Sit: minimum assistance, assistance provided for LLE, and pt required increased time.     · Sit to Supine: minimum assistance assistance provided for LLE    AM-PAC 6 CLICK MOBILITY  Total Score:8       Therapeutic Activities and Exercises:  ·  Bed mobility as listed above.   · Pt tolerated sitting EOB for ~3 minutes before returning to supine with HOB elevated.   · While sitting pt scooted towards HOB with CGA and verbal cueing.     Patient left HOB elevated with all lines intact, call button in reach, DEANDRE Peña notified and family present.    GOALS:    Physical Therapy Goals        Problem: Physical Therapy Goal    Goal Priority Disciplines Outcome Goal Variances Interventions   Physical Therapy Goal      PT/OT, PT Ongoing (interventions implemented as appropriate)     Description:  Goals to be met by: 18     Patient will increase functional independence with mobility by performin)Sup <>sit with SBA  2) Sit <>Stand with RW and Supervision.  3) Bed <>chair with RW and Supervision.   4) Ambulate 100 feet with RW and Supervision.   5) Correctly state all posterior hip precautions independently without verbal cueing.                     History:     Past Medical History:   Diagnosis Date    Chronic kidney disease, stage III (moderate)     DJD (degenerative joint disease)     Dyslipidemia     Essential hypertension, benign     Obesity     PVD (peripheral vascular disease)     Type II or unspecified type diabetes mellitus without mention of complication, uncontrolled        Past Surgical History:   Procedure Laterality Date    EYE SURGERY      cataracts    glandular surgery      HYSTERECTOMY      TOTAL KNEE ARTHROPLASTY      left and right       Clinical Decision Making:     History  Co-morbidities and personal factors that may impact the plan of care Examination  Body Structures and Functions, activity limitations and participation restrictions that may impact the plan of care Clinical Presentation   Decision Making/ Complexity Score   Co-morbidities:   [] Time since onset of injury / illness / exacerbation  [] Status of current condition  []Patient's cognitive status and safety concerns    [] Multiple Medical Problems (see med hx)  Personal Factors:   [] Patient's age  [] Prior Level of function   [] Patient's home situation (environment and family support)  [] Patient's level of motivation  [] Expected progression of patient      HISTORY:(criteria)    [] 74697 - no personal factors/history    [] 32909 - has 1-2 personal factor/comorbidity     [] 95351 - has >3 personal factor/comorbidity     Body Regions:  [] Objective examination findings  [] Head     []  Neck  [] Trunk   [] Upper Extremity  []  Lower Extremity    Body Systems:  [] For communication ability, affect, cognition, language, and learning style: the assessment of the ability to make needs known, consciousness, orientation (person, place, and time), expected emotional /behavioral responses, and learning preferences (eg, learning barriers, education  needs)  [] For the neuromuscular system: a general assessment of gross coordinated movement (eg, balance, gait, locomotion, transfers, and transitions) and motor function  (motor control and motor learning)  [] For the musculoskeletal system: the assessment of gross symmetry, gross range of motion, gross strength, height, and weight  [] For the integumentary system: the assessment of pliability(texture), presence of scar formation, skin color, and skin integrity  [] For cardiovascular/pulmonary system: the assessment of heart rate, respiratory rate, blood pressure, and edema     Activity limitations:    [] Patient's cognitive status and saf ety concerns          [] Status of current condition      [] Weight bearing restriction  [] Cardiopulmunary Restriction    Participation Restrictions:   [] Goals and goal agreement with the patient     [] Rehab potential (prognosis) and probable outcome      Examination of Body System: (criteria)    [] 89795 - addressing 1-2 elements    [] 09182 - addressing a total of 3 or more elements     [] 99944 -  Addressing a total of 4 or more elements         Clinical Presentation: (criteria)  Choose one     On examination of body system using standardized tests and measures patient presents with (CHOOSE ONE) elements from any of the following: body structures and functions, activity limitations, and/or participation restrictions.  Leading to a clinical presentation that is considered (CHOOSE ONE)                              Clinical Decision Making  (Eval Complexity):  Choose One     Time Tracking:     PT Received On: 06/29/18  PT Start Time: 1135     PT Stop Time:  "1155  PT Total Time (min): 20 min +37 minutes =57 minutes total     Billable Minutes: Evaluation 20       John Petesron, PT, DPT  06/29/2018         PM Treatment Session:     Pt returned to pt's room for second treatment session from 6483-8082 (37 minutes). Spoke with DEANDRE Peña prior to treatment session. Pt was found supine in bed with HOB elevated and daughter in room. Pt rated pain 10/10 in left hip. Pt with significant fear that "this ball is going to pop out" PT educated pt about her precautions and WB status and pt with decreased anxiety. Pt's BP was taken in supine and was 159/67. Pt performed supine >sit with Min A for assistance with LLE and increased time. After sitting ~1 minute her BP was 140/63. Pt c/o dizziness initially upon sitting that improved. Pt sat EOB for ~5 minutes. Unsuccessful first attempt to stand; however, pt was able to perform sit >stand with Mod A and RW on second attempt. Pt stood for ~1 minute then performed ~5 sides steps left and right X2. Pt ambulated ~40 feet with RW and CGA, VC given to maintain posterior hip precautions while turning. Pt reported increased dizziness when returned to sitting position. Min A to return to supine for assistance with LLE. Pt denied dizziness at end of treatment session and rated her pain an 8/10, pt left resting in supine with HOB elevated, call button in reach and daughter in the room. Mariana CARRERA notified of pt's current assistance level and improved activity tolerance during PM treatment session.  At this time would recommend HHPT/OT upon hospital discharge.     Billable Minutes: Gait: 15 Therapeutic Activity: 22   "

## 2018-06-29 NOTE — PLAN OF CARE
Problem: Patient Care Overview  Goal: Plan of Care Review  No significant events. AAOx4. Resp even and unlabored on room air. Jamil dressing to left hip CDI. Red patent and draining yellow urine. Abductor pillow in use to BLE's. IVF's d/c'd patient is diabetic and tolerating diet. Will encourage po fluids for adequate fluid intake. Family at bedside. Bed in low locked position, side rails elevated x2. Cb in reach. Purposeful rounding done every hour

## 2018-06-29 NOTE — PLAN OF CARE
Problem: Physical Therapy Goal  Goal: Physical Therapy Goal  Goals to be met by: 18     Patient will increase functional independence with mobility by performin)Sup <>sit with SBA  2) Sit <>Stand with RW and Supervision.  3) Bed <>chair with RW and Supervision.   4) Ambulate 100 feet with RW and Supervision.   5) Correctly state all posterior hip precautions independently without verbal cueing.   Outcome: Ongoing (interventions implemented as appropriate)  PT orders received, initial evaluation complete PT to follow.

## 2018-06-29 NOTE — OP NOTE
[facility]  Operative Report    SUMMARY     Surgery Date: 6/29/2018     Surgeon(s) and Role:     * Onel Hager MD - Primary    Assistant: RANDY Sharma FA    Pre-op Diagnosis:  Primary localized osteoarthrosis of left hip [M16.12]    Post-op Diagnosis:  Post-Op Diagnosis Codes:     * Primary localized osteoarthrosis of left hip [M16.12]    Procedure(s) (LRB):  ARTHROPLASTY, HIP TOTAL (Left) (Queenie TM hip)    Anesthesia: Spinal    Description of Procedure: Appropriate consent was signed. The patient understood   and accepted all risks and complications. The patient was brought to the Operating Room after undergoing spinal anesthetic. Red catheter was placed. The patient was then placed in a lateral decubitus position on a peg board with all bony   prominences padded. Perineal drape was applied. The Operative hip and lower extremity were then prepped and draped in a sterile manner and a mini posterior approach was utilized. Dissection was taken down to fascia, which was incised and   gluteus catherine muscle split in line of its fibers.The Charnley retractor was then   placed and the hip internally rotated. The external rotators and capsule were   taken off as one flap and peeled back to protect the sciatic nerve. It was   tagged with #5 Ethibond suture. Leg was then levelled and 2 pins were placed, 1  in the greater trochanter and 1 in the iliac crest and distance measured 9.5  cm. Pin was then removed from greater trochanter and hip dislocated. A femoral  neck osteotomy was performed in 25 mm above the lesser trochanter as   templated on preoperative x-rays. Femoral head was removed and proximal femur   was exposed. It was opened up with the cookie cutter, starter reamer and   lateralizing reamer. Trabecular metal reamers were utilized up to 13 mm and   broaching was performed to 13 mm. A thrombin-soaked sponge is placed in the   proximal femur and attention was then directed to the  acetabulum.    Labrum and soft tissue were excised and reaming was begun with a 46 mm reamer   and progressed to 50 mm. A 50 mm press-fit trabecular metal cup with cluster   holes was then impacted obtaining excellent fixation. 2 dome screws were placed  enhancing fixation. A 50 mm neutral highly cross-linked polyethylene liner was  then snapped in the place and checked for loosening. Osteophytes were removed   from around the acetabulum.    Attention was then directed back to the proximal femur where the trial 13 mm   trabecular metal broach was placed and trials were performed. An extended neck   was required along with a 36 mm head plus 0 mm neck. Leg length measured 10.0   cm. Broach is then removed and a 13 mm extended offset trabecular metal stem was then impacted in the proximal femur obtaining excellent fixation. A 36 mm   cobalt chrome head   +0 mm neck was then impacted on the dried trunnion   relocated brought through full range of motion and found to be quite stable.   The hip was then washed out copiously with antibiotic solution through the   Pulsavac. The external rotators and capsule were reattached to trochanteric   attachment through drill holes utilizing #5 Ethibond suture. Exparel cocktail   was injected into the fascia and subcutaneous tissue. Fascia was closed with a   running #2 Quill suture. Subcutaneous closure was obtained with #1 and 2-0   Vicryl. Skin was then closed with staples and a sterile compressive dressing   was applied. The patient was placed in abduction pillow and brought to Recovery  Room in good condition.    Estimated Blood Loss: 300cc         Specimens:   Specimen (12h ago through future)    None

## 2018-06-29 NOTE — PLAN OF CARE
[facility]       [address]       [facility] [address]       [phone]               [facility] Orthopedic Discharge Orders    [facility]           Expected Discharge Date: 6/30/2018    Diagnoses:  Post-op  left hip(s) replacement    Patient is homebound due to:   Pt requires home health services due to taxing effort to leave the home as a result of immobility from Post-op left hip(s) replacement      Weight Bearing Status:   full weight bearing: left leg      Hip Precautions for 6 weeks, AVOID:  -Avoid greater than 90 degrees of flexion, internal rotation, and adduction  -Avoid extension, external rotation, and abduction      Physical Therapy   3 times a week   - Ambulate with a rolling walker  - Progress to cane  -Instruct on ROM and strengthening of knee    Wound Care:   If patient is discharged with aqua thais/silver dressing, leave on for 5 days unless saturated border to border, then follow instructions below:  Cleanse with wound cleanser or normal saline and apply Mepore Pro dressing.  If Mepore pro not available apply gauze and tegaderm.  Change 3 times a week or PRN if dry.  Teach patient to change daily if draining.          Contact:  Please contact the nurse practitionerLesly at [phone] at Ext [phone]. with concerns.  She is in surgery M,W,F so if urgent and needs to be addressed prior to the end of the day call the  and they with contact her in the OR or Clinic.       BLOOD THINNER:    If sent home on Xarelto         -14 days post-op for TKR       -30 days post-op for THR     If sent home home on ASA    325mg   BID x 4 weeks     Once finished with prescribed blood thinner, patients can return to pre-surgical ASA dosage if they took ASA before surgery.     Change incision dressing in standing unless the precautions are maintained in side lying.      Pt may shower if incision dressing has waterproof dressing in place. Removal and replacement of dressing after shower  only needed if incision is suspected to have gotten wet during shower.  Otherwise change as previously described depending on dressing/drainage.    Home Health Nurse for Wound Checks and to remove staples on POD # 14    PT/SN to remove staples 14 days Post-op and apply skin prep and steri-strips.     No soaking in the tub or hot tub use. Cold therapy/Ice encouraged at least 20 minutes 2-3 times daily or more if desired.  Incision must be kept waterproof while icing.      TTWB unless otherwise indicated.  Progress to cane as able.  Set up for outpatient PT as soon as able after staple removal once patient is MOD I with cane.    Outpatient Therapy: [facility]    [address] [address]   or  [address] [place] [address]    Call [phone] to schedule appointment  Fax [phone]    If need orders: Call Roseann at Ext [phone]      Wear  TEDS Bilateral Knee High Stockings for 3  Weeks    THR: Posterior THR precautions x 6 weeks: No ER, No ADD, NO Flexion past 90 Degrees. Must sleep with HIp abduction pillow or regular Pillow between Legs.  MARITA Hose x 3 weeks. Ok to remove marita hose 1-2 hours/day max if desired.       DME:  - rolling Walker  - 3 in 1 commode  - tub bench / shower chair  - Hip Kit  - Per PT/OT recommendation  - Other:Braydon Hager

## 2018-06-29 NOTE — OR NURSING
Pr resting with eyes closed, awakens easily to verbal stimuli. No c/o pain or nausea and VSS, ready for transfer to inpatient room. Family updated and sent to room 361 & report called to DEANDRE Peña

## 2018-06-30 LAB
ERYTHROCYTE [DISTWIDTH] IN BLOOD BY AUTOMATED COUNT: 14.5 %
HCT VFR BLD AUTO: 31.6 %
HGB BLD-MCNC: 10.1 G/DL
MCH RBC QN AUTO: 29.9 PG
MCHC RBC AUTO-ENTMCNC: 32 G/DL
MCV RBC AUTO: 94 FL
PLATELET # BLD AUTO: 212 K/UL
PMV BLD AUTO: 10.6 FL
POCT GLUCOSE: 179 MG/DL (ref 70–110)
POCT GLUCOSE: 180 MG/DL (ref 70–110)
POCT GLUCOSE: 188 MG/DL (ref 70–110)
POCT GLUCOSE: 239 MG/DL (ref 70–110)
RBC # BLD AUTO: 3.38 M/UL
WBC # BLD AUTO: 7.54 K/UL

## 2018-06-30 PROCEDURE — 85027 COMPLETE CBC AUTOMATED: CPT

## 2018-06-30 PROCEDURE — 94761 N-INVAS EAR/PLS OXIMETRY MLT: CPT

## 2018-06-30 PROCEDURE — 97166 OT EVAL MOD COMPLEX 45 MIN: CPT

## 2018-06-30 PROCEDURE — 97110 THERAPEUTIC EXERCISES: CPT

## 2018-06-30 PROCEDURE — 63600175 PHARM REV CODE 636 W HCPCS: Performed by: ORTHOPAEDIC SURGERY

## 2018-06-30 PROCEDURE — 25000003 PHARM REV CODE 250: Performed by: NURSE PRACTITIONER

## 2018-06-30 PROCEDURE — 97116 GAIT TRAINING THERAPY: CPT

## 2018-06-30 PROCEDURE — 36415 COLL VENOUS BLD VENIPUNCTURE: CPT

## 2018-06-30 PROCEDURE — 99900035 HC TECH TIME PER 15 MIN (STAT)

## 2018-06-30 PROCEDURE — 94799 UNLISTED PULMONARY SVC/PX: CPT

## 2018-06-30 PROCEDURE — 97530 THERAPEUTIC ACTIVITIES: CPT

## 2018-06-30 PROCEDURE — G8988 SELF CARE GOAL STATUS: HCPCS | Mod: CJ

## 2018-06-30 PROCEDURE — 11000001 HC ACUTE MED/SURG PRIVATE ROOM

## 2018-06-30 PROCEDURE — 25000003 PHARM REV CODE 250: Performed by: ORTHOPAEDIC SURGERY

## 2018-06-30 PROCEDURE — 25000003 PHARM REV CODE 250: Performed by: INTERNAL MEDICINE

## 2018-06-30 PROCEDURE — 63600175 PHARM REV CODE 636 W HCPCS: Performed by: NURSE PRACTITIONER

## 2018-06-30 PROCEDURE — G8987 SELF CARE CURRENT STATUS: HCPCS | Mod: CK

## 2018-06-30 RX ORDER — LORAZEPAM 0.5 MG/1
0.5 TABLET ORAL EVERY 6 HOURS PRN
Status: DISCONTINUED | OUTPATIENT
Start: 2018-06-30 | End: 2018-07-01 | Stop reason: HOSPADM

## 2018-06-30 RX ADMIN — ASPIRIN 325 MG ORAL TABLET 325 MG: 325 PILL ORAL at 09:06

## 2018-06-30 RX ADMIN — AMLODIPINE BESYLATE 10 MG: 5 TABLET ORAL at 09:06

## 2018-06-30 RX ADMIN — INSULIN ASPART 2 UNITS: 100 INJECTION, SOLUTION INTRAVENOUS; SUBCUTANEOUS at 09:06

## 2018-06-30 RX ADMIN — DOXAZOSIN MESYLATE 2 MG: 2 TABLET ORAL at 09:06

## 2018-06-30 RX ADMIN — FAMOTIDINE 20 MG: 20 TABLET ORAL at 09:06

## 2018-06-30 RX ADMIN — OXYCODONE HYDROCHLORIDE 5 MG: 5 TABLET ORAL at 09:06

## 2018-06-30 RX ADMIN — OXYCODONE HYDROCHLORIDE 5 MG: 5 TABLET ORAL at 04:06

## 2018-06-30 RX ADMIN — CEFAZOLIN SODIUM 2 G: 2 SOLUTION INTRAVENOUS at 12:06

## 2018-06-30 RX ADMIN — OXYCODONE HYDROCHLORIDE 10 MG: 5 TABLET ORAL at 08:06

## 2018-06-30 RX ADMIN — ATORVASTATIN CALCIUM 10 MG: 10 TABLET, FILM COATED ORAL at 09:06

## 2018-06-30 RX ADMIN — LORAZEPAM 0.5 MG: 0.5 TABLET ORAL at 01:06

## 2018-06-30 RX ADMIN — DOCUSATE SODIUM 100 MG: 100 CAPSULE, LIQUID FILLED ORAL at 09:06

## 2018-06-30 RX ADMIN — MUPIROCIN 1 G: 20 OINTMENT TOPICAL at 09:06

## 2018-06-30 RX ADMIN — OXYCODONE HYDROCHLORIDE 10 MG: 5 TABLET ORAL at 04:06

## 2018-06-30 RX ADMIN — ACETAMINOPHEN 1000 MG: 10 INJECTION, SOLUTION INTRAVENOUS at 05:06

## 2018-06-30 NOTE — PT/OT/SLP PROGRESS
Physical Therapy Treatment    Patient Name:  Lakesha Lorenzana   MRN:  [id number]    Recommendations:     Discharge Recommendations:  home health OT, home health PT   Discharge Equipment Recommendations: 3-in-1 commode, walker, rolling (both items delivered to room)   Barriers to discharge: None (pt will be going to her son's home and will have assistance from him and wife upon discharge)    Assessment:     Lakesha Lorenzana is a 77 y.o. female admitted with a medical diagnosis of Primary osteoarthritis of left hip.  She presents with the following impairments/functional limitations:  weakness, impaired endurance, impaired functional mobilty, gait instability, impaired balance, impaired self care skills, decreased lower extremity function, pain, edema, impaired skin, decreased ROM, decreased safety awareness, orthopedic precautions ;pt with good mobility this PM, able to ambulate further, though still needing lots of cueing for hip precautions and safety with RW.    Rehab Prognosis:  good; patient would benefit from acute skilled PT services to address these deficits and reach maximum level of function.      Recent Surgery: Procedure(s) (LRB):  ARTHROPLASTY, HIP TOTAL (Left) 1 Day Post-Op    Plan:     During this hospitalization, patient to be seen BID to address the above listed problems via gait training, therapeutic activities, therapeutic exercises  · Plan of Care Expires:  07/29/18   Plan of Care Reviewed with: patient, daughter    Subjective     Communicated with nurse prior to session.  Patient found supine in bed upon PT entry to room, agreeable to treatment.      Chief Complaint: LLE sciatica type pain   Patient comments/goals: pt wanting to get up from bed, uncomfortable  Pain/Comfort:  · Pain Rating 1: 8/10  · Location - Side 1: Left  · Location - Orientation 1: generalized  · Location 1: leg (pt reports sciatica type pain in buttock that runs down to knee)  · Pain Addressed 1: Pre-medicate for activity,  Reposition, Distraction, Nurse notified  · Pain Rating Post-Intervention 1: 8/10    Patients cultural, spiritual, Alevism conflicts given the current situation: none stated    Objective:     Patient found with: peripheral IV, hip abduction pillow, SCD     General Precautions: Standard, fall, diabetic (anxiety)   Orthopedic Precautions:LLE weight bearing as tolerated, LLE posterior precautions   Braces: N/A     Functional Mobility:  · Bed Mobility:     · Supine to Sit: minimum assistance and at trunk and LLE, lots of cueing for technique, HOB partially up  · Transfers:     · Sit to Stand:  minimum assistance with rolling walker and from elevated EOB and pt using 1 bedrail  · Gait: amb'd 120' with RW and CGA, cueing for safety (pt lets go of RW at times and is easily distracted)      AM-PAC 6 CLICK MOBILITY  Turning over in bed (including adjusting bedclothes, sheets and blankets)?: 2  Sitting down on and standing up from a chair with arms (e.g., wheelchair, bedside commode, etc.): 3  Moving from lying on back to sitting on the side of the bed?: 2  Moving to and from a bed to a chair (including a wheelchair)?: 3  Need to walk in hospital room?: 3  Climbing 3-5 steps with a railing?: 1  Basic Mobility Total Score: 14       Therapeutic Activities and Exercises:   pt perf'd seated LAURITA Rodriguez x 10 ea. , reviewed hip precautions again with pt.     Patient left up in chair , 2 pillows underneath, with all lines intact, call button in reach, nurse notified and daughter present, dinner tray present. Nsg to t/f back to bed.    GOALS:    Physical Therapy Goals        Problem: Physical Therapy Goal    Goal Priority Disciplines Outcome Goal Variances Interventions   Physical Therapy Goal     PT/OT, PT Ongoing (interventions implemented as appropriate)     Description:  Goals to be met by: 18     Patient will increase functional independence with mobility by performin)Sup <>sit with SBA  2) Sit <>Stand with RW and  Supervision.  3) Bed <>chair with RW and Supervision.   4) Ambulate 100 feet with RW and Supervision.   5) Correctly state all posterior hip precautions independently without verbal cueing.                     Time Tracking:     PT Received On: 06/30/18  PT Start Time: 1711     PT Stop Time: 1735  PT Total Time (min): 24 min     Billable Minutes: Gait Training 14 and Therapeutic Activity 10    Treatment Type: Treatment  PT/PTA: PTA     PTA Visit Number: 1     Merry Johnson PTA  06/30/2018

## 2018-06-30 NOTE — PROGRESS NOTES
"Internal Medicine  Progress Note    Admit Date: 6/29/2018   LOS: 1 day     SUBJECTIVE:     Follow-up For: Medical management     Interval History:  No complaints. Daughter at the bedside.  Has not voided yet.  Eating well.  Pain controlled.    Review of Systems:   Constitutional: no fever or chills   Respiratory: no cough or shorness of breath   Cardiovascular: no chest pain or palpitations   Gastrointestinal: no nausea or vomiting, no abdominal pain or change in bowel habits   Genitourinary: no hematuria or dysuria   Musculoskeletal: no arthralgias or myalgias   Neurological: no seizures or tremors    OBJECTIVE:     Vital Signs Range (Last 24H):  BP (!) 164/73 (BP Location: Right arm, Patient Position: Lying)   Pulse 72   Temp 98.5 °F (36.9 °C) (Oral)   Resp 18   Ht 5' 4" (1.626 m)   Wt 105 kg (231 lb 7.7 oz)   SpO2 96%   Breastfeeding? No   BMI 39.73 kg/m²     Temp:  [97.5 °F (36.4 °C)-98.5 °F (36.9 °C)]   Pulse:  [67-86]   Resp:  [16-18]   BP: (143-167)/(57-73)   SpO2:  [93 %-100 %]     I & O (Last 24H):  Intake/Output Summary (Last 24 hours) at 06/30/18 0958  Last data filed at 06/30/18 0100   Gross per 24 hour   Intake             1660 ml   Output             2225 ml   Net             -565 ml       Physical Exam:  General appearance: Well developed, well nourished  Eyes:  Conjunctivae/corneas clear. EOMI  Lungs: Normal respiratory effort,   clear to auscultation bilaterally   Heart: Regular rate and rhythm, S1, S2 normal, no murmur, rub or luis.  Abdomen: Soft, non-tender non-distended; bowel sounds normal; no masses,  no organomegaly  Extremities: No edema.  abductor pillow in place  Skin: Skin color, texture, turgor normal. No rashes or lesions  Neurologic: Normal strength and tone. No focal numbness or weakness     Laboratory Data:    Recent Labs  Lab 06/30/18  0817   WBC 7.54   RBC 3.38*   HGB 10.1*   HCT 31.6*      MCV 94   MCH 29.9   MCHC 32.0       BMP: No results for input(s): GLU, NA, " K, CL, CO2, BUN, CREATININE, CALCIUM, MG in the last 168 hours.    Invalid input(s):  PHOS  Lab Results   Component Value Date    CALCIUM 10.7 (H) 06/19/2018         Medications:  Medication list was reviewed and changes noted under Assessment/Plan.    Diagnostic Results: Reviewed.      ASSESSMENT/PLAN:     1. Left hip repair (E11.6): per therapy and ortho teams  2. PVD (I73.9): On ASA.  3. 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 Hold parameters on BP medications  4. Hyperlipidemia (E78.5): statin  5. Vit D deficiency (E55.9): continue ergo at discharge.  6. DM T2 (E11.6): hold PO meds and use mod dose SSI  DVT prophy:  mg BID, MARITA and SCD per ortho    Dispo: Stable for discharge from medical standpoint after pt voids.     Thank you for allowing me to participate in the care of this patient.    Manjinder Sierra MD  Nephrology

## 2018-06-30 NOTE — PT/OT/SLP PROGRESS
"Physical Therapy Treatment    Patient Name:  Lakesha Lorenzana   MRN:  [id number]    Recommendations:     Discharge Recommendations:  home health OT, home health PT   Discharge Equipment Recommendations: 3-in-1 commode, walker, rolling   Barriers to discharge: Decreased caregiver support (pt's daugther present for tx session, however son will be the one taking care of pt, and is unavailable for training today).    Assessment:     Lakesha Lorenzana is a 77 y.o. female admitted with a medical diagnosis of Primary osteoarthritis of left hip.  She presents with the following impairments/functional limitations:  weakness, impaired endurance, impaired functional mobilty, gait instability, impaired balance, impaired self care skills, decreased lower extremity function, pain, edema, impaired skin, decreased ROM, orthopedic precautions, decreased safety awareness ;pt with fair mobility today, req'd min/modA for t/f's sup to sit to stand and unable to remember hip precautions.    Rehab Prognosis:  good; patient would benefit from acute skilled PT services to address these deficits and reach maximum level of function.      Recent Surgery: Procedure(s) (LRB):  ARTHROPLASTY, HIP TOTAL (Left) 1 Day Post-Op    Plan:     During this hospitalization, patient to be seen BID to address the above listed problems via gait training, therapeutic activities, therapeutic exercises  · Plan of Care Expires:  07/29/18   Plan of Care Reviewed with: patient, daughter    Subjective     Communicated with nurse prior to session.  Patient found supine in bed upon PT entry to room, agreeable to treatment.      Chief Complaint: pt c/o LLE pain  Patient comments/goals: "It's sciatica pain. It ran all the way down to my foot yesterday, today it's going to my knee."    Pain/Comfort:  · Pain Rating 1: 8/10 (at rest)  · Location - Side 1: Left  · Location - Orientation 1: generalized  · Location 1: leg (pt reporting pain from posterior hip down to " knee)  · Pain Addressed 1: Pre-medicate for activity, Reposition, Distraction  · Pain Rating Post-Intervention 1: 10/10 (with amb.)    Patients cultural, spiritual, Sikhism conflicts given the current situation: none stated    Objective:     Patient found with: peripheral IV, hip abduction pillow, SCD     General Precautions: Standard, fall, diabetic (anxiety)   Orthopedic Precautions:LLE weight bearing as tolerated, LLE posterior precautions   Braces: N/A     Functional Mobility:  · Bed Mobility:     · Supine to Sit: moderate assistance  · Transfers:     · Sit to Stand:  minimum assistance and moderate assistance with rolling walker  · Gait: amb'd 60' x 2 with RW and CGA/min.A, WBAT on LLE, cueing for upright posture and safety.      AM-PAC 6 CLICK MOBILITY  Turning over in bed (including adjusting bedclothes, sheets and blankets)?: 2  Sitting down on and standing up from a chair with arms (e.g., wheelchair, bedside commode, etc.): 2  Moving from lying on back to sitting on the side of the bed?: 2  Moving to and from a bed to a chair (including a wheelchair)?: 2  Need to walk in hospital room?: 3  Climbing 3-5 steps with a railing?: 1  Basic Mobility Total Score: 12       Therapeutic Activities and Exercises:   pt perf'd supine LE ex's of AP's, QS, GS, heelslides (within hip prec); seated LAQ's x 10 ea.   Reviewed hip prec with pt and daughter that is present in room (she is not the family  Member that will be assisting pt at home).     Patient left up in chair with all lines intact, call button in reach, nurse notified and daughter present..    GOALS:    Physical Therapy Goals        Problem: Physical Therapy Goal    Goal Priority Disciplines Outcome Goal Variances Interventions   Physical Therapy Goal     PT/OT, PT Ongoing (interventions implemented as appropriate)     Description:  Goals to be met by: 18     Patient will increase functional independence with mobility by performin)Sup <>sit with  SBA  2) Sit <>Stand with RW and Supervision.  3) Bed <>chair with RW and Supervision.   4) Ambulate 100 feet with RW and Supervision.   5) Correctly state all posterior hip precautions independently without verbal cueing.                     Time Tracking:     PT Received On: 06/30/18  PT Start Time: 1100     PT Stop Time: 1150  PT Total Time (min): 50 min     Billable Minutes: Gait Training 25, Therapeutic Activity 10 and Therapeutic Exercise 15    Treatment Type: Treatment  PT/PTA: PTA     PTA Visit Number: 1     Merry Johnson PTA  06/30/2018

## 2018-06-30 NOTE — PT/OT/SLP EVAL
Occupational Therapy   Evaluation/Treatment    Name: Lakesha Lorenzana  MRN: [id number]  Admitting Diagnosis:  Primary osteoarthritis of left hip 1 Day Post-Op, s/p Left ELAINE    Recommendations:     Discharge Recommendations: home health OT, home health PT  Discharge Equipment Recommendations:  3-in-1 commode, walker, rolling (Bariatric DME preferred)  Barriers to discharge:  Decreased caregiver support    History:     Occupational Profile:  Living Environment: lives with her  in a single story house with 5 steps at entry/ will be staying at her daughter-in-law/s house with is one story with 1 step at entry  Previous level of function: ambulatory with her /s RW, family provide transportation, MI self-care, cooks and does laundry, with a  doing the house cleaning  Roles and Routines: none  Equipment Owned:  cane, quad, cane, straight, bath bench (tub-shower / DME available for use)  Assistance upon Discharge: she has family assist at discharge, the amount of help available is uncertain    Past Medical History:   Diagnosis Date    Chronic kidney disease, stage III (moderate)     DJD (degenerative joint disease)     Dyslipidemia     Essential hypertension, benign     Obesity     PVD (peripheral vascular disease)     Type II or unspecified type diabetes mellitus without mention of complication, uncontrolled        Past Surgical History:   Procedure Laterality Date    EYE SURGERY      cataracts    glandular surgery      HYSTERECTOMY      TOTAL KNEE ARTHROPLASTY      left and right       Subjective     Chief Complaint: hip pain  Patient/Family stated goals: regain PLOF  Communicated with: patient and her daughter prior to session.  The patient was agreeable to the OT session.  Pain/Comfort:  · Pain Rating 1: 8/10  · Location - Side 1: Left  · Location - Orientation 1: generalized  · Location 1: hip (groin)  · Pain Addressed 1: Pre-medicate for activity, Reposition, Cessation of Activity,  Distraction  · Pain Rating Post-Intervention 1: 8/10    Patients cultural, spiritual, Synagogue conflicts given the current situation: None    Objective:     Patient found with: peripheral IV    General Precautions: Standard, fall   Orthopedic Precautions:LLE weight bearing as tolerated, LLE posterior precautions   Braces: N/A     Occupational Performance:    Bed Mobility:    · Mod A sit>supine - instruction for hip precautions    Functional Mobility/Transfers:  · Min A sit><stand with RW  · Min A toilet transfer with RW    Ambulated chair>bathroom>EOB with RW CGA    Activities of Daily Living:  · MI self-feeding bed level  · Min A G/H (wash hands) standing at sink with RW  · MI UBD ([facility] hospital gown as robe) sitting EOB  · Total A LBD at EOB  · Min A toilet hygiene - at toilet    Cognitive/Visual Perceptual:  Cognitive/Psychosocial Skills:     -       Oriented to: Person, Place, Time and Situation   -       Follows Commands/attention:Follows one-step commands (inconsistent, needs repetition of instruction or manual assist to elicit expected responses)  -       Communication: clear/fluent  -       Memory: Impaired STM and Poor immediate recall  -       Safety awareness/insight to disability: impaired   -       Mood/Affect/Coping skills/emotional control: Labile, Despondent, Anxious and Agitated    Physical Exam:  Postural examination/scapula alignment:    -       Rounded shoulders  -       Forward head  -       Posterior pelvic tilt  -       Abnormal trunk flexion  Skin integrity: Visible skin intact  Edema:  Moderate Left hip  Sensation:    -       Intact for light touch both hands  Motor Planning:    -       poor  Dominant hand:    -       Right  UE ROM: WFL BUE  UE Strength: WFL BUE  Hand Function: WFL for self-care both hands  Gross motor coordination:   Poor sitting balance (needs (B) UE support) seated EOB, poor standing balance with gait instability using RW, poor safety judgement, impulsive, anxious with  "poor learning    Patient left with bed in chair position with all lines intact, call button in reach, bed alarm on, nurse notified and patient's daughter present    AM PAC 6 Click:  AM PAC Total Score: 17    Treatment & Education:  Hip precautions, bed mobility with hip precautions, RW uses and safety, problem solving with self-care, anxiety management, pain management training    Education:    Assessment:     Lakesha Lorenzana is a 77 y.o. female with a medical diagnosis of Primary osteoarthritis of left hip, s/p Left ELAINE.  She presents with Performance deficits affecting function are impaired functional mobilty, gait instability, impaired endurance, impaired self care skills, decreased lower extremity function, pain, decreased safety awareness, orthopedic precautions (anxiety).effected performance during the session.  OT treatment is needed to instruct in hip precautions and RW use with functional mobility and self-care tasks.       Rehab Prognosis:  good; patient would benefit from acute skilled OT services to address these deficits and reach maximum level of function.         Clinical Decision Makin.  OT Mod:  "Pt evaluation falls under moderate complexity for evaluation coding due to identification of 3-5 performance deficits noted as stated above. Eval required Min/Mod assistance to complete on this date and detailed assessment(s) were utilized. Moreover, an expanded review of history and occupational profile obtained with additional review of cognitive, physical and psychosocial hx."  Extra time, behavior management, careful pacing of activity for patient activity tolerance and modification of instructions and task presentation needed to optimize patient performance.    Plan:     Patient to be seen daily to address the above listed problems via self-care/home management, therapeutic activities, therapeutic exercises  · Plan of Care Expires:    · Plan of Care Reviewed with: patient, daughter    This " Plan of care has been discussed with the patient who was involved in its development and understands and is in agreement with the identified goals and treatment plan    GOALS:    Occupational Therapy Goals        Problem: Occupational Therapy Goal    Goal Priority Disciplines Outcome Interventions   Occupational Therapy Goal     OT, PT/OT Ongoing (interventions implemented as appropriate)    Description:  Goals to be met by 6/30/18  1. Mod A LBD  2. Supervision toilet hygiene  3. Min A VC for hip precautions during mobility and ambulatory self-care tasks  4. Discuss home safety and set-up for self-care                    Time Tracking:     OT Date of Treatment: 06/30/18  OT Start Time: 1139  OT Stop Time: 1255  OT Total Time (min): 76 min    Billable Minutes:Evaluation 42  Self Care/Home Management 34    AYAH Benson  6/30/2018

## 2018-06-30 NOTE — PLAN OF CARE
Problem: Patient Care Overview  Goal: Plan of Care Review  Outcome: Ongoing (interventions implemented as appropriate)  AAOx4.  NAD noted.  Patient remains free from injury or falls. Vital signs stable throughout night on room air. Positions self independently. Voiding adequately through shift via Red catheter. Pain managed with PO medications. Purposeful rounding done.  All needs met. Abduction pillow in place. Bed in low locked position and call light within reach. Will continue to monitor.

## 2018-06-30 NOTE — PROGRESS NOTES
SW delivered hip kit, long (pt aware HK is not covered by insurance and will be billed $42), RW and BSC and pt signed for delivery.  SW informed pt, daughter, present that WC rental is $75 mn and $75 deposit.  Pt no longer want WC.

## 2018-06-30 NOTE — PLAN OF CARE
Problem: Infection, Risk/Actual (Adult)  Goal: Infection Prevention/Resolution  Patient will demonstrate the desired outcomes by discharge/transition of care.   Outcome: Ongoing (interventions implemented as appropriate)  OT and PT suggested patient stay another day, Dr. Moran notified. Remains free from fall, injury, and skin breakdown. Ambulates with walker to bathroom. VSS stable on RA and afebrile. Positions self independently. Pain controlled with PO PRN meds. Neuro checks WDL. TEDs/SCDs maintained.Tolerating ordered diet. IV site WNL. Plan of care reviewed with patient and all questions answered. Bed low, locked w/ bed alarm on. Call light within reach. Purposeful rounding performed. No other complaints at this time.

## 2018-06-30 NOTE — PLAN OF CARE
06/30/18 0920   Final Note   Assessment Type Final Discharge Note   Discharge Disposition Home-Health  ([facility])   What phone number can be called within the next 1-3 days to see how you are doing after discharge? ([phone])   Hospital Follow Up  Appt(s) scheduled? No   Discharge plans and expectations educations in teach back method with documentation complete? No

## 2018-06-30 NOTE — PLAN OF CARE
Problem: Patient Care Overview  Goal: Plan of Care Review  Pt currently on  RA . Pt in no distress at this time. Will continue to monitor.

## 2018-06-30 NOTE — PLAN OF CARE
Problem: Physical Therapy Goal  Goal: Physical Therapy Goal  Goals to be met by: 18     Patient will increase functional independence with mobility by performin)Sup <>sit with SBA  2) Sit <>Stand with RW and Supervision.  3) Bed <>chair with RW and Supervision.   4) Ambulate 100 feet with RW and Supervision.   5) Correctly state all posterior hip precautions independently without verbal cueing.    Pt with fair progression towards goals today, limited by c/o's 10/10 pain in LLE (sciatica pain per pt) and limited by anxiety. Pt sup to sit modA, sit to stand min/modA with RW and cueing for hip precautions, amb'd 60' x 2 with RW and CGA/min.A, pt able to recall 1/3 hip prec. Pt would benefit from continued therapy in hospital and family present in AM for instruction, prior to discharge home.

## 2018-06-30 NOTE — PLAN OF CARE
Problem: Occupational Therapy Goal  Goal: Occupational Therapy Goal  Goals to be met by 6/30/18  1. Mod A LBD  2. Supervision toilet hygiene  3. Min A VC for hip precautions during mobility and ambulatory self-care tasks  4. Discuss home safety and set-up for self-care  Outcome: Ongoing (interventions implemented as appropriate)  OT evaluation completed with treatment initiated.  Recommend Home Health PT and OT at discharge.  DME: Bariatric RW and 3-in-1 commode.  AYAH Benson 6/30/2018

## 2018-07-01 VITALS
WEIGHT: 231.5 LBS | DIASTOLIC BLOOD PRESSURE: 75 MMHG | HEART RATE: 89 BPM | SYSTOLIC BLOOD PRESSURE: 156 MMHG | TEMPERATURE: 98 F | BODY MASS INDEX: 39.52 KG/M2 | HEIGHT: 64 IN | RESPIRATION RATE: 18 BRPM | OXYGEN SATURATION: 98 %

## 2018-07-01 LAB
ERYTHROCYTE [DISTWIDTH] IN BLOOD BY AUTOMATED COUNT: 14.3 %
HCT VFR BLD AUTO: 31.6 %
HGB BLD-MCNC: 10.2 G/DL
MCH RBC QN AUTO: 30 PG
MCHC RBC AUTO-ENTMCNC: 32.3 G/DL
MCV RBC AUTO: 93 FL
PLATELET # BLD AUTO: 191 K/UL
PMV BLD AUTO: 10.3 FL
POCT GLUCOSE: 133 MG/DL (ref 70–110)
POCT GLUCOSE: 182 MG/DL (ref 70–110)
POCT GLUCOSE: 201 MG/DL (ref 70–110)
RBC # BLD AUTO: 3.4 M/UL
WBC # BLD AUTO: 9.61 K/UL

## 2018-07-01 PROCEDURE — G8988 SELF CARE GOAL STATUS: HCPCS | Mod: CJ

## 2018-07-01 PROCEDURE — 97110 THERAPEUTIC EXERCISES: CPT

## 2018-07-01 PROCEDURE — 25000003 PHARM REV CODE 250: Performed by: ORTHOPAEDIC SURGERY

## 2018-07-01 PROCEDURE — 36415 COLL VENOUS BLD VENIPUNCTURE: CPT

## 2018-07-01 PROCEDURE — 25000003 PHARM REV CODE 250: Performed by: INTERNAL MEDICINE

## 2018-07-01 PROCEDURE — 63600175 PHARM REV CODE 636 W HCPCS: Performed by: INTERNAL MEDICINE

## 2018-07-01 PROCEDURE — 97530 THERAPEUTIC ACTIVITIES: CPT

## 2018-07-01 PROCEDURE — 97116 GAIT TRAINING THERAPY: CPT

## 2018-07-01 PROCEDURE — 97535 SELF CARE MNGMENT TRAINING: CPT

## 2018-07-01 PROCEDURE — G8989 SELF CARE D/C STATUS: HCPCS | Mod: CK

## 2018-07-01 PROCEDURE — 25000003 PHARM REV CODE 250: Performed by: NURSE PRACTITIONER

## 2018-07-01 PROCEDURE — 85027 COMPLETE CBC AUTOMATED: CPT

## 2018-07-01 RX ORDER — LISINOPRIL 20 MG/1
40 TABLET ORAL DAILY
Status: DISCONTINUED | OUTPATIENT
Start: 2018-07-01 | End: 2018-07-01 | Stop reason: HOSPADM

## 2018-07-01 RX ORDER — IBUPROFEN 200 MG
16 TABLET ORAL
Status: DISCONTINUED | OUTPATIENT
Start: 2018-07-01 | End: 2018-07-01 | Stop reason: HOSPADM

## 2018-07-01 RX ORDER — INSULIN ASPART 100 [IU]/ML
3 INJECTION, SOLUTION INTRAVENOUS; SUBCUTANEOUS
Status: DISCONTINUED | OUTPATIENT
Start: 2018-07-01 | End: 2018-07-01 | Stop reason: HOSPADM

## 2018-07-01 RX ORDER — IBUPROFEN 200 MG
24 TABLET ORAL
Status: DISCONTINUED | OUTPATIENT
Start: 2018-07-01 | End: 2018-07-01 | Stop reason: HOSPADM

## 2018-07-01 RX ORDER — GLUCAGON 1 MG
1 KIT INJECTION
Status: DISCONTINUED | OUTPATIENT
Start: 2018-07-01 | End: 2018-07-01 | Stop reason: HOSPADM

## 2018-07-01 RX ADMIN — AMLODIPINE BESYLATE 10 MG: 5 TABLET ORAL at 09:07

## 2018-07-01 RX ADMIN — OXYCODONE HYDROCHLORIDE 5 MG: 5 TABLET ORAL at 04:07

## 2018-07-01 RX ADMIN — MUPIROCIN 1 G: 20 OINTMENT TOPICAL at 09:07

## 2018-07-01 RX ADMIN — ASPIRIN 325 MG ORAL TABLET 325 MG: 325 PILL ORAL at 09:07

## 2018-07-01 RX ADMIN — LISINOPRIL 40 MG: 20 TABLET ORAL at 09:07

## 2018-07-01 RX ADMIN — INSULIN DETEMIR 5 UNITS: 100 INJECTION, SOLUTION SUBCUTANEOUS at 09:07

## 2018-07-01 RX ADMIN — FAMOTIDINE 20 MG: 20 TABLET ORAL at 09:07

## 2018-07-01 RX ADMIN — DOCUSATE SODIUM 100 MG: 100 CAPSULE, LIQUID FILLED ORAL at 09:07

## 2018-07-01 RX ADMIN — INSULIN ASPART 3 UNITS: 100 INJECTION, SOLUTION INTRAVENOUS; SUBCUTANEOUS at 12:07

## 2018-07-01 RX ADMIN — LORAZEPAM 0.5 MG: 0.5 TABLET ORAL at 09:07

## 2018-07-01 RX ADMIN — DOXAZOSIN MESYLATE 2 MG: 2 TABLET ORAL at 09:07

## 2018-07-01 RX ADMIN — INSULIN ASPART 3 UNITS: 100 INJECTION, SOLUTION INTRAVENOUS; SUBCUTANEOUS at 04:07

## 2018-07-01 RX ADMIN — LORAZEPAM 0.5 MG: 0.5 TABLET ORAL at 12:07

## 2018-07-01 RX ADMIN — OXYCODONE HYDROCHLORIDE 10 MG: 5 TABLET ORAL at 09:07

## 2018-07-01 RX ADMIN — ATORVASTATIN CALCIUM 10 MG: 10 TABLET, FILM COATED ORAL at 09:07

## 2018-07-01 RX ADMIN — INSULIN ASPART 3 UNITS: 100 INJECTION, SOLUTION INTRAVENOUS; SUBCUTANEOUS at 09:07

## 2018-07-01 RX ADMIN — OXYCODONE HYDROCHLORIDE 10 MG: 5 TABLET ORAL at 02:07

## 2018-07-01 NOTE — PT/OT/SLP PROGRESS
Physical Therapy Treatment    Patient Name:  Lakesha Lorenzana   MRN:  [id number]    Recommendations:     Discharge Recommendations:    home health PT/OT  Discharge Equipment Recommendations:   Pt's DME has been delivered to her room   Barriers to discharge: Pt requires constant supervision and cues for hip precautions/safety/technique    Assessment:     Lakesha Lorenzana is a 77 y.o. female admitted with a medical diagnosis of Primary osteoarthritis of left hip.  She presents with the following impairments/functional limitations as per PT evaluation; other non-PT impairments affecting safety and quality of mobility include decreased safety awareness, is easily distractable, impaired memory, and occasional anxious behavior that contribute to impaired performance and safety with functional mobility; do not anticipate memory or distractibility will improve with intensive physical therapy, therefore she will require constant close supervision at all times with discharge home. She is making slow progress. She has been unable to recall 3/3 posterior hip precautions despite review during all therapy session. She completed transfers, gait, and stair training with constant cues and CGA; daughter present for training. Will continue to follow and progress as tolerated if patient does not discharge home today as planned    Rehab Prognosis:  Fair - ; patient would benefit from acute skilled PT services to address these deficits and reach maximum level of function.      Recent Surgery: Procedure(s) (LRB):  ARTHROPLASTY, HIP TOTAL (Left) 2 Days Post-Op    Plan:     During this hospitalization, patient to be seen BID to address the above listed problems via gait training, therapeutic activities, therapeutic exercises  · Plan of Care Expires:  07/29/18   Plan of Care Reviewed with: patient, daughter    Subjective     Communicated with nurse prior to session.  Patient found supine in bed with HOB elevated upon PT entry to room,  "agreeable to treatment.      Chief Complaint: pain  Patient comments/goals: go home  Pain/Comfort:  · Pain Rating 1: 8/10  · Location - Side 1: Left  · Location 1: hip  · Pain Addressed 1: Pre-medicate for activity, Cessation of Activity  · Pain Rating Post-Intervention 1: 9/10    Patients cultural, spiritual, Christianity conflicts given the current situation: none stated    Objective:         General Precautions: Standard, fall, diabetic (anxiety)   Orthopedic Precautions:LLE weight bearing as tolerated, LLE posterior precautions   Braces:   NA    Functional Mobility:  · Bed Mobility:     · Supine to Sit: SBA with bed flat, constant verbal cues for sequencing, technique and hip precautions  · Sit to Supine: did not occur  · Transfers:     · Sit to Stand:  contact guard assistance with rolling walker and x 3 trials. Verbal cues for technique and precautions.   · Gait: x 138 ft, x 64 ft on level tile with rolling walker and CGA. Verbal cues for upright posture, maintaining UEs on walker handles at all times (constant cues for this and pt continued to remove hands from walker to point or gesture).   · Stairs: Pt ascended/descended 4 steps with unilateral hand rail and CGA with step-to pattern and constant verbal and visual cues for sequencing and technique. Pt ascended/descended 6" curb step with rolling walker and CGA x 2 trials with verbal cues first trial only for sequencing.         AM-PAC 6 CLICK MOBILITY  Turning over in bed (including adjusting bedclothes, sheets and blankets)?: 2  Sitting down on and standing up from a chair with arms (e.g., wheelchair, bedside commode, etc.): 3  Moving from lying on back to sitting on the side of the bed?: 3  Moving to and from a bed to a chair (including a wheelchair)?: 3  Need to walk in hospital room?: 3  Climbing 3-5 steps with a railing?: 3  Basic Mobility Total Score: 17       Therapeutic Activities and Exercises:  Pt performed sitting therapeutic exercises including L " knee flexion, L long arc quads, bilateral ankle pumps x 20 reps with verbal and visual cues.  Pt became diaphoretic during gait. Heart rate 105 bpm and SpO2 97%.  Nurse notified and checked blood sugar. Blood pressure 170's/70's.    Pt able to correctly verbalize 2/3 hip precautions with testing and review x 3 during session.      Patient left up in chair with all lines intact, call button in reach, nurse notified and daughter present..    GOALS:    Physical Therapy Goals        Problem: Physical Therapy Goal    Goal Priority Disciplines Outcome Goal Variances Interventions   Physical Therapy Goal     PT/OT, PT Ongoing (interventions implemented as appropriate)     Description:  Goals to be met by: 18     Patient will increase functional independence with mobility by performin)Sup <>sit with SBA  2) Sit <>Stand with RW and Supervision.  3) Bed <>chair with RW and Supervision.   4) Ambulate 100 feet with RW and Supervision.   5) Correctly state all posterior hip precautions independently without verbal cueing.                     Time Tracking:     PT Received On: 18  PT Start Time: 1023     PT Stop Time: 1128  PT Total Time (min): 65 min     Billable Minutes: Gait Training 30, Therapeutic Activity 20 and Therapeutic Exercise 15    Treatment Type: Treatment  PT/PTA: PT     PTA Visit Number: 0     Aileen Villa, PT  2018

## 2018-07-01 NOTE — PLAN OF CARE
Problem: Physical Therapy Goal  Goal: Physical Therapy Goal  Goals to be met by: 18     Patient will increase functional independence with mobility by performin)Sup <>sit with SBA  2) Sit <>Stand with RW and Supervision.  3) Bed <>chair with RW and Supervision.   4) Ambulate 100 feet with RW and Supervision.   5) Correctly state all posterior hip precautions independently without verbal cueing.    Outcome: Ongoing (interventions implemented as appropriate)  Pt progressing slowly with PT. She has decreased safety awareness, is easily distractable, impaired memory, and occasional anxious behavior that contribute to impaired performance and safety with functional mobility; do not anticipate memory or distractibility will improve with intensive physical therapy, therefore she will require constant close supervision at all times with discharge home. She has been unable to recall 3/3 posterior hip precautions despite review during all therapy session. She completed transfers, gait, and stair training with constant cues and CGA; daughter present for training. Will continue to follow and progress as tolerated if patient does not discharge home today as planned. Please see progress note for detailed plan of care and recommendations.

## 2018-07-01 NOTE — DISCHARGE INSTRUCTIONS
Hip Replacement Discharge Instructions    1. Pain:  a. After surgery you may feel some pain in the operative leg groin area. This is normal. Your hip will likely have been injected with a numbing medicine (Exparel) prior to completion of surgery for pain control. This is indicated on a green bracelet that you will wear for 4 days after surgery. You will also get a prescription for pain control before you leave the hospital.  b. Elevate your leg when sitting for comfort  2. Incision Care:  a. Some drainage from the incision in the first 72 hours is normal. If drainage is excessive, remove bandage,  pat dry, cover with sterile gauze, and secure with tape. Notify M.D. for excessive drainage.  b. Staples will be removed 14 days after surgery.  3. Activity:  a. See attached hip precautions and follow for 6 weeks (instructions found at the end of packet):  b. Perform exercises 3 times a day.  c. Use a hard, flat surface, such as a firm mattress, when exercising.  d. You may shower 2 days after surgery providing the dressing is waterproof.DR CODY PATIENTS CANNOT SHOWER UNTIL STAPLES ARE REMOVED. Support/help is mandatory during showering. If dressing becomes wet, replace with a new dressing. No bathing or swimming for 6 weeks or until incision is completely healed.  e. Wear jose hose for 3 weeks after surgery. You may remove for 1-2 hours during the day only.Send patient home with an extra pair jose hose.  4. Safety:  a. Add cushions to low chairs and car seats for elevation.  b. When getting in and out of a car, it is important to keep your leg straight and out to the side. Wear a seatbelt at all times.  c. You will be given a raised toilet seat (3-1 commode).  d. Use a walker, cane, or crutches as long as M.D. recommends.  5. Possible Complications: Report to Surgeon  a. Infection  i. Unexpected redness  ii. Persistent drainage  iii. Temperature ,can be treated with tylenol. Do not go to the emergency room or urgent  care for a temperature, call your surgeon.   iii. Additional swelling  iv. Pain not controlled with current pain medicine  b. Blood clot  i. Unusual pain  ii. Red or discolored skin  iii. Swelling  iv. Unusual warm skin  c. Dislocation  i. Severe hip pain especially when moved  ii. The injured leg is shorter than the uninjured leg  iii. The injured leg lies in an abnormal position. In most cases the leg is bent at the hip, turned inward and pulled toward the middle of the body.         Hip Replacement Discharge Instructions    6. Pain:  a. After surgery you may feel some pain in the operative leg groin area. This is normal. Your hip will likely have been injected with a numbing medicine (Exparel) prior to completion of surgery for pain control. This is indicated on a green bracelet that you will wear for 4 days after surgery. You will also get a prescription for pain control before you leave the hospital.  b. Elevate your leg when sitting for comfort  7. Incision Care:  a. Some drainage from the incision in the first 72 hours is normal. If drainage is excessive, remove bandage,  pat dry, cover with sterile gauze, and secure with tape. Notify M.D. for excessive drainage.  b. Staples will be removed 14 days after surgery.  8. Activity:  a. See attached hip precautions and follow for 6 weeks (instructions found at the end of packet):  b. Perform exercises 3 times a day.  c. Use a hard, flat surface, such as a firm mattress, when exercising.  d. You may shower 2 days after surgery providing the dressing is waterproof.DR CODY PATIENTS CANNOT SHOWER UNTIL STAPLES ARE REMOVED. Support/help is mandatory during showering. If dressing becomes wet, replace with a new dressing. No bathing or swimming for 6 weeks or until incision is completely healed.  e. Wear jose hose for 3 weeks after surgery. You may remove for 1-2 hours during the day only.Send patient home with an extra pair jose hose.  9. Safety:  a. Add cushions to low  chairs and car seats for elevation.  b. When getting in and out of a car, it is important to keep your leg straight and out to the side. Wear a seatbelt at all times.  c. You will be given a raised toilet seat (3-1 commode).  d. Use a walker, cane, or crutches as long as [email or web address] recommends.  10. Possible Complications: Report to Surgeon  a. Infection  i. Unexpected redness  ii. Persistent drainage  iii. Temperature ,can be treated with tylenol. Do not go to the emergency room or urgent care for a temperature, call your surgeon.   iii. Additional swelling  iv. Pain not controlled with current pain medicine  b. Blood clot  i. Unusual pain  ii. Red or discolored skin  iii. Swelling  iv. Unusual warm skin  c. Dislocation  i. Severe hip pain especially when moved  ii. The injured leg is shorter than the uninjured leg  iii. The injured leg lies in an abnormal position. In most cases the leg is bent at the hip, turned inward and pulled toward the middle of the body.

## 2018-07-01 NOTE — PT/OT/SLP PROGRESS
Occupational Therapy   Treatment/Discharge    Name: Lakesha Lorenzana  MRN: [id number]  Admitting Diagnosis:  Primary osteoarthritis of left hip  2 Days Post-Op, s/p Left ELAINE    Recommendations:     Discharge Recommendations: home health OT, home health PT  Discharge Equipment Recommendations:  3-in-1 commode, walker, rolling, hip kit (hip kit with long shoe horn)  Barriers to discharge:  Decreased caregiver support    Subjective     Communicated with: patient, patient's daughter, and daughter-in-law prior to session.  The patient and her family were agreeable to the OT session  Pain/Comfort:  · Pain Rating 1: 0/10 (seated in chair at beginning of session)  · Location - Side 1: Left  · Location - Orientation 1: generalized  · Location 1: hip  · Pain Addressed 1: Pre-medicate for activity, Reposition, Cessation of Activity  · Pain Rating Post-Intervention 1: 5/10 (seated in chair at end of session)    Patients cultural, spiritual, Orthodoxy conflicts given the current situation: none    Objective:     Patient found with:  (none)    General Precautions: Standard, blind, diabetic, fall   Orthopedic Precautions:LLE weight bearing as tolerated, LLE posterior precautions   Braces: N/A     Occupational Performance:    Bed Mobility:    · None     Functional Mobility/Transfers:  · Mod A sit>stand/CGA stand>sit with RW (instruction needed for sit><stand to prevent Left hip rotation)  · Min A VC toilet transfer with RW  · Min A chair transfer with RW    Ambulated chair>bathroom>EOB with RW SBA    Activities of Daily Living:  · MI self-feeding seated in chair  · Min A G/H (wash hands) standing at sink with RW  · MI UBD ([facility], don shirt) sitting EOB  · Mod A LBD (don briefs and shorts with reacher, declined AE instruction for shoes and socks, daughter-in-law instructed on how to help with LBD)  · Supervision toilet hygiene - at toilet    Patient left up in chair with all lines intact, call button in reach, nurse   and patient's family present    AM PAC 6 Click:  AM PAC Total Score: 18    Treatment & Education:  Hip precautions and RW use reviewed, Discussed home safety and set-up for self-care, car transfer, assisting with LBD (with and without AE).  Education:    Assessment:     Lakesha Lorenzana is a 77 y.o. female with a medical diagnosis of Primary osteoarthritis of left hip, s/p Left ELAINE.  She presents with Performance deficits affecting function are weakness, impaired endurance, impaired functional mobilty, impaired self care skills, gait instability, impaired balance, decreased lower extremity function, decreased safety awareness, decreased ROM, edema, orthopedic precautions, pain (anxiety). Effected performance during the session. The patient had improve attention and ability to follow hip precaution approach to tasks with Mod A VC.  She was Mod A sit>stand/CGA stand>sit, Min A VC toilet transfer and Min A chair transfer with RW.  Self-care tasks were MI UBD, Mod A LBD, Min A G/H standing at sink and Supervision toilet hygiene.  D/C OT, hospital discharge planned for today.      Rehab Prognosis:  good; patient would benefit from acute skilled OT services to address these deficits and reach maximum level of function.       Plan:     Patient to be seen daily to address the above listed problems via self-care/home management, therapeutic activities, therapeutic exercises  · Plan of Care Expires: 08/30/18  · Plan of Care Reviewed with: patient    This Plan of care has been discussed with the patient who was involved in its development and understands and is in agreement with the identified goals and treatment plan    GOALS:    Occupational Therapy Goals     Not on file          Multidisciplinary Problems (Resolved)        Problem: Occupational Therapy Goal    Goal Priority Disciplines Outcome Interventions   Occupational Therapy Goal   (Resolved)     OT, PT/OT Outcome(s) achieved    Description:  Goals to be met by  6/30/18  1. Mod A LBD (MET 7/l1/18)  2. Supervision toilet hygiene (MET 7/1/18)  3. Min A VC for hip precautions during mobility and ambulatory self-care tasks NOT MET  4. Discuss home safety and set-up for self-care (MET 7/L1/18)                    Time Tracking:     OT Date of Treatment: 07/01/18  OT Start Time: 1311  OT Stop Time: 1402  OT Total Time (min): 51 min    Billable Minutes:Self Care/Home Management 51    AYAH Benson  7/1/2018

## 2018-07-01 NOTE — PLAN OF CARE
07/01/18 1243   Medicare Message   Important Message from Medicare regarding Discharge Appeal Rights Explained to patient/caregiver;Signed/date by patient/caregiver   Date IMM was signed 07/01/18   Time IMM was signed 0810

## 2018-07-01 NOTE — NURSING
Eager & in agreement w/ DC. VU of DC instructions--paperwork & prescriptions passed & explained .  IV removed w/ cath tip intact, WNL. Verified HH & DME set up via [facility] & walker has arrived to pt room. . To be DCd home w/ family-- will be escorted downstairs via  transport team. Free from falls, injury, or skin breakdown this hospital admission. Remains in room  awaiting transport.

## 2018-07-01 NOTE — PLAN OF CARE
Problem: Patient Care Overview  Goal: Plan of Care Review  Outcome: Ongoing (interventions implemented as appropriate)  Patient on RA with good saturations. IS done. Will continue to monitor

## 2018-07-01 NOTE — PROGRESS NOTES
"Internal Medicine  Progress Note    Admit Date: 6/29/2018   LOS: 2 days     SUBJECTIVE:     Follow-up For: Medical management     Interval History:  No complaints. Daughter at the bedside.  Eating well.  Pain fairly-controlled.    Review of Systems:   Constitutional: no fever or chills   Respiratory: no cough or shorness of breath   Cardiovascular: no chest pain or palpitations   Gastrointestinal: no nausea or vomiting, no abdominal pain or change in bowel habits   Genitourinary: no hematuria or dysuria   Musculoskeletal: no arthralgias or myalgias   Neurological: no seizures or tremors    OBJECTIVE:     Vital Signs Range (Last 24H):  BP (!) 183/92 (BP Location: Right arm, Patient Position: Lying)   Pulse 91   Temp 98.3 °F (36.8 °C) (Oral)   Resp 18   Ht 5' 4" (1.626 m)   Wt 105 kg (231 lb 7.7 oz)   SpO2 97%   Breastfeeding? No   BMI 39.73 kg/m²     Temp:  [98 °F (36.7 °C)-98.7 °F (37.1 °C)]   Pulse:  [83-98]   Resp:  [18]   BP: (128-184)/(68-92)   SpO2:  [95 %-97 %]     I & O (Last 24H):    Intake/Output Summary (Last 24 hours) at 07/01/18 1008  Last data filed at 07/01/18 0600   Gross per 24 hour   Intake              240 ml   Output             1400 ml   Net            -1160 ml       Physical Exam:  General appearance: Well developed, well nourished  Eyes:  Conjunctivae/corneas clear. EOMI  Lungs: Normal respiratory effort,   clear to auscultation bilaterally   Heart: Regular rate and rhythm, S1, S2 normal, no murmur, rub or luis.  Abdomen: Soft, non-tender non-distended; bowel sounds normal; no masses,  no organomegaly  Extremities: No edema.  abductor pillow in place  Skin: Skin color, texture, turgor normal. No rashes or lesions  Neurologic: Normal strength and tone. No focal numbness or weakness     Laboratory Data:    Recent Labs  Lab 07/01/18  0445   WBC 9.61   RBC 3.40*   HGB 10.2*   HCT 31.6*      MCV 93   MCH 30.0   MCHC 32.3       BMP: No results for input(s): GLU, NA, K, CL, CO2, BUN, " CREATININE, CALCIUM, MG in the last 168 hours.    Invalid input(s):  PHOS  Lab Results   Component Value Date    CALCIUM 10.7 (H) 06/19/2018       Medications:  Medication list was reviewed and changes noted under Assessment/Plan.    Diagnostic Results: Reviewed.    ASSESSMENT/PLAN:     1. Left hip repair (E11.6): per therapy and ortho teams  2. PVD (I73.9): On ASA.  3. 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 Resumed CCB and ACEi.  ELevated readings here likely from pain/agitation.  Pt instructed to call PCP within 1-2 days if home BPs persistently >140/90.  4. Hyperlipidemia (E78.5): statin  5. Vit D deficiency (E55.9): continue ergo at discharge.  6. DM T2 (E11.6): hold PO meds and use mod dose SSI  DVT prophy:  mg BID, MARITA and SCD per ortho    Dispo: Stable for discharge from medical standpoint with close f/u with PCP and Ortho.     Thank you for allowing me to participate in the care of this patient.    Manjinder Sierra MD  Nephrology

## 2018-07-01 NOTE — PLAN OF CARE
Problem: Occupational Therapy Goal  Goal: Occupational Therapy Goal  Goals to be met by 6/30/18  1. Mod A LBD (MET 7/l1/18)  2. Supervision toilet hygiene (MET 7/1/18)  3. Min A VC for hip precautions during mobility and ambulatory self-care tasks NOT MET  4. Discuss home safety and set-up for self-care (MET 7/L1/18)  Outcome: Outcome(s) achieved Date Met: 07/01/18  The patient had improve attention and ability to follow hip precaution approach to tasks with Mod A VC.  She was Mod A sit>stand/CGA stand>sit, Min A VC toilet transfer and Min A chair transfer with RW.  Self-care tasks were MI UBD, Mod A LBD, Min A G/H standing at sink and Supervision toilet hygiene.  AYAH Benson 7/1/2018

## 2018-07-02 NOTE — PT/OT/SLP DISCHARGE
Physical Therapy Discharge Summary    Name: Lakesha Lorenzana  MRN: [id number]   Principal Problem: Primary osteoarthritis of left hip     Patient Discharged from acute Physical Therapy on 18.  Please refer to prior PT noted date on 18 for functional status.     Assessment:     Patient has not met goals.    Objective:     GOALS:    Physical Therapy Goals     Not on file          Multidisciplinary Problems (Resolved)        Problem: Physical Therapy Goal    Goal Priority Disciplines Outcome Goal Variances Interventions   Physical Therapy Goal   (Resolved)     PT/OT, PT Outcome(s) achieved     Description:  Goals to be met by: 18     Patient will increase functional independence with mobility by performin)Sup <>sit with SBA  2) Sit <>Stand with RW and Supervision.  3) Bed <>chair with RW and Supervision.   4) Ambulate 100 feet with RW and Supervision.   5) Correctly state all posterior hip precautions independently without verbal cueing.                     Reasons for Discontinuation of Therapy Services  Transfer to alternate level of care.      Plan:     Patient Discharged to: Home with Home Health Service.    Aileen Villa, PT  2018

## 2018-07-20 NOTE — DISCHARGE SUMMARY
[facility]  Short Stay  Discharge Summary  TOTAL HIP REPLACEMENT    Admit Date: 6/29/2018    Discharge Date and Time: 7/1/2018  5:43 PM      Discharge Attending Physician: Onel Hager MD     Hospital Course:  Lakesha Lorenzana,is a 78 y.o. female with severe osteoarthritis,   L hip, unrelieved with the conservative measures. The patient was admitted on 6/29/2018 underwent L total hip replacement.  Postoperatively, the patient did well and was weightbearing as tolerated with a walker. Lakesha Lorenzana was instructed on hip precautions.  The patient was discharged on 7/1/2018 with home health physical therapy and nursing. The patient will be on Percocet for pain and aspirin 325 mg p.o. b.i.d. with meals x4 weeks. I will see them back in the office in 4 weeks for followup.      Final Diagnoses:    Principal Problem: Primary osteoarthritis of left hip   Secondary Diagnoses:   Active Hospital Problems    Diagnosis  POA    *Primary osteoarthritis of left hip [M16.12]  Yes      Resolved Hospital Problems    Diagnosis Date Resolved POA   No resolved problems to display.       Discharged Condition: good    Disposition: Home-Health Care [facility]    Follow up/Patient Instructions:    Medications:  Reconciled Home Medications:      Medication List      START taking these medications    oxyCODONE-acetaminophen 5-325 mg per tablet  Commonly known as:  PERCOCET  Take 1-2 every 4-6 hours        ASK your doctor about these medications    amLODIPine 10 MG tablet  Commonly known as:  NORVASC  Take 1 tablet (10 mg total) by mouth once daily.     aspirin 81 MG EC tablet  Commonly known as:  ECOTRIN  Take 81 mg by mouth once daily. Instructed to stop 7 days prior to surgery per Dr. Hager     atorvastatin 10 MG tablet  Commonly known as:  LIPITOR  Take 1 tablet (10 mg total) by mouth once daily.     calcium citrate-vitamin D3 315-250 mg-unit Tab  Commonly known as:  CITRACAL + D MAXIMUM  Take 315 mg by mouth 2 (two) times  daily.     doxazosin 2 MG tablet  Commonly known as:  CARDURA  Take 1 tablet (2 mg total) by mouth once daily.     ergocalciferol 50,000 unit Cap  Commonly known as:  ERGOCALCIFEROL  Take 50,000 Units by mouth every 30 days.     glimepiride 4 MG tablet  Commonly known as:  AMARYL  Take 1 tablet (4 mg total) by mouth 2 (two) times daily. 1 tablet with breakfast, half a tablet every evening     GLUCOPHAGE 1000 MG tablet  Generic drug:  metFORMIN  Take 1,000 mg by mouth 2 (two) times daily with meals.     lisinopril 40 MG tablet  Commonly known as:  PRINIVIL,ZESTRIL  Take 1 tablet (40 mg total) by mouth once daily.     SITagliptan-metformin 50-1,000 mg per tablet  Commonly known as:  JANUMET  Take 1 tablet by mouth 2 (two) times daily with meals.          No discharge procedures on file.  Follow-up Information     [facility]    Specialties:  Home Health Services, DME Provider  Why:  Home Health  Contact information:  [address] [place] [id number]  [phone]             [facility]    Specialty:  DME Provider  Why:  Please follow up with [facility] if you have a problem with Rolling Walker or Bedside Commode  Contact information:  [address] [facility] [address] [phone]

## 2018-12-25 ENCOUNTER — HOSPITAL ENCOUNTER (OUTPATIENT)
Facility: HOSPITAL | Age: 78
Discharge: HOME | End: 2018-12-26
Attending: EMERGENCY MEDICINE | Admitting: EMERGENCY MEDICINE
Payer: MEDICARE

## 2018-12-25 DIAGNOSIS — I50.9 CHF (CONGESTIVE HEART FAILURE): ICD-10-CM

## 2018-12-25 DIAGNOSIS — I50.9 ACUTE ON CHRONIC CONGESTIVE HEART FAILURE, UNSPECIFIED HEART FAILURE TYPE: Primary | ICD-10-CM

## 2018-12-25 DIAGNOSIS — R06.02 SHORTNESS OF BREATH: ICD-10-CM

## 2018-12-25 DIAGNOSIS — E11.22 TYPE 2 DIABETES MELLITUS WITH STAGE 2 CHRONIC KIDNEY DISEASE, WITHOUT LONG-TERM CURRENT USE OF INSULIN: ICD-10-CM

## 2018-12-25 DIAGNOSIS — N18.2 TYPE 2 DIABETES MELLITUS WITH STAGE 2 CHRONIC KIDNEY DISEASE, WITHOUT LONG-TERM CURRENT USE OF INSULIN: ICD-10-CM

## 2018-12-25 DIAGNOSIS — I10 ESSENTIAL HYPERTENSION, BENIGN: ICD-10-CM

## 2018-12-25 DIAGNOSIS — R60.0 PERIPHERAL EDEMA: ICD-10-CM

## 2018-12-25 DIAGNOSIS — R79.89 ELEVATED TROPONIN: ICD-10-CM

## 2018-12-25 LAB
ALBUMIN SERPL BCP-MCNC: 3.6 G/DL
ALP SERPL-CCNC: 65 U/L
ALT SERPL W/O P-5'-P-CCNC: 8 U/L
ANION GAP SERPL CALC-SCNC: 13 MMOL/L
APTT BLDCRRT: 26.6 SEC
AST SERPL-CCNC: 11 U/L
BASOPHILS # BLD AUTO: 0.01 K/UL
BASOPHILS NFR BLD: 0.1 %
BILIRUB SERPL-MCNC: 0.4 MG/DL
BILIRUB UR QL STRIP: NEGATIVE
BNP SERPL-MCNC: 395 PG/ML
BUN SERPL-MCNC: 24 MG/DL
CALCIUM SERPL-MCNC: 9.6 MG/DL
CHLORIDE SERPL-SCNC: 100 MMOL/L
CLARITY UR: CLEAR
CO2 SERPL-SCNC: 26 MMOL/L
COLOR UR: YELLOW
CREAT SERPL-MCNC: 0.9 MG/DL
DIFFERENTIAL METHOD: ABNORMAL
EOSINOPHIL # BLD AUTO: 0.1 K/UL
EOSINOPHIL NFR BLD: 1.5 %
ERYTHROCYTE [DISTWIDTH] IN BLOOD BY AUTOMATED COUNT: 15.8 %
EST. GFR  (AFRICAN AMERICAN): >60 ML/MIN/1.73 M^2
EST. GFR  (NON AFRICAN AMERICAN): >60 ML/MIN/1.73 M^2
GLUCOSE SERPL-MCNC: 130 MG/DL
GLUCOSE UR QL STRIP: NEGATIVE
HCT VFR BLD AUTO: 31.7 %
HGB BLD-MCNC: 10.1 G/DL
HGB UR QL STRIP: NEGATIVE
INR PPP: 1
KETONES UR QL STRIP: NEGATIVE
LEUKOCYTE ESTERASE UR QL STRIP: NEGATIVE
LYMPHOCYTES # BLD AUTO: 1.6 K/UL
LYMPHOCYTES NFR BLD: 19.3 %
MCH RBC QN AUTO: 29.1 PG
MCHC RBC AUTO-ENTMCNC: 31.9 G/DL
MCV RBC AUTO: 91 FL
MONOCYTES # BLD AUTO: 0.5 K/UL
MONOCYTES NFR BLD: 5.9 %
NEUTROPHILS # BLD AUTO: 5.9 K/UL
NEUTROPHILS NFR BLD: 73.2 %
NITRITE UR QL STRIP: NEGATIVE
PH UR STRIP: 6 [PH] (ref 5–8)
PLATELET # BLD AUTO: 237 K/UL
PMV BLD AUTO: 10.6 FL
POCT GLUCOSE: 102 MG/DL (ref 70–110)
POCT GLUCOSE: 116 MG/DL (ref 70–110)
POTASSIUM SERPL-SCNC: 4.1 MMOL/L
PROT SERPL-MCNC: 6.5 G/DL
PROT UR QL STRIP: NEGATIVE
PROTHROMBIN TIME: 10.5 SEC
RBC # BLD AUTO: 3.47 M/UL
SODIUM SERPL-SCNC: 139 MMOL/L
SP GR UR STRIP: 1.02 (ref 1–1.03)
TROPONIN I SERPL DL<=0.01 NG/ML-MCNC: 0.03 NG/ML
URN SPEC COLLECT METH UR: NORMAL
UROBILINOGEN UR STRIP-ACNC: NEGATIVE EU/DL
WBC # BLD AUTO: 8.02 K/UL

## 2018-12-25 PROCEDURE — G0378 HOSPITAL OBSERVATION PER HR: HCPCS

## 2018-12-25 PROCEDURE — 99285 EMERGENCY DEPT VISIT HI MDM: CPT | Mod: 25

## 2018-12-25 PROCEDURE — 36415 COLL VENOUS BLD VENIPUNCTURE: CPT

## 2018-12-25 PROCEDURE — 83880 ASSAY OF NATRIURETIC PEPTIDE: CPT

## 2018-12-25 PROCEDURE — 81003 URINALYSIS AUTO W/O SCOPE: CPT

## 2018-12-25 PROCEDURE — 85730 THROMBOPLASTIN TIME PARTIAL: CPT

## 2018-12-25 PROCEDURE — 63600175 PHARM REV CODE 636 W HCPCS: Performed by: EMERGENCY MEDICINE

## 2018-12-25 PROCEDURE — 93005 ELECTROCARDIOGRAM TRACING: CPT

## 2018-12-25 PROCEDURE — 96374 THER/PROPH/DIAG INJ IV PUSH: CPT

## 2018-12-25 PROCEDURE — 85025 COMPLETE CBC W/AUTO DIFF WBC: CPT

## 2018-12-25 PROCEDURE — 85610 PROTHROMBIN TIME: CPT

## 2018-12-25 PROCEDURE — 93010 ELECTROCARDIOGRAM REPORT: CPT | Mod: ,,, | Performed by: INTERNAL MEDICINE

## 2018-12-25 PROCEDURE — 82962 GLUCOSE BLOOD TEST: CPT

## 2018-12-25 PROCEDURE — 83036 HEMOGLOBIN GLYCOSYLATED A1C: CPT

## 2018-12-25 PROCEDURE — 25000003 PHARM REV CODE 250: Performed by: EMERGENCY MEDICINE

## 2018-12-25 PROCEDURE — 84484 ASSAY OF TROPONIN QUANT: CPT

## 2018-12-25 PROCEDURE — 80053 COMPREHEN METABOLIC PANEL: CPT

## 2018-12-25 RX ORDER — FUROSEMIDE 10 MG/ML
20 INJECTION INTRAMUSCULAR; INTRAVENOUS ONCE
Status: DISCONTINUED | OUTPATIENT
Start: 2018-12-26 | End: 2018-12-25

## 2018-12-25 RX ORDER — ASPIRIN 81 MG/1
81 TABLET ORAL DAILY
Status: DISCONTINUED | OUTPATIENT
Start: 2018-12-26 | End: 2018-12-26 | Stop reason: HOSPADM

## 2018-12-25 RX ORDER — HYDRALAZINE HYDROCHLORIDE 25 MG/1
25 TABLET, FILM COATED ORAL
Status: COMPLETED | OUTPATIENT
Start: 2018-12-25 | End: 2018-12-25

## 2018-12-25 RX ORDER — ATORVASTATIN CALCIUM 10 MG/1
10 TABLET, FILM COATED ORAL DAILY
Status: DISCONTINUED | OUTPATIENT
Start: 2018-12-26 | End: 2018-12-26 | Stop reason: HOSPADM

## 2018-12-25 RX ORDER — FUROSEMIDE 10 MG/ML
40 INJECTION INTRAMUSCULAR; INTRAVENOUS
Status: COMPLETED | OUTPATIENT
Start: 2018-12-25 | End: 2018-12-25

## 2018-12-25 RX ORDER — IBUPROFEN 200 MG
24 TABLET ORAL
Status: DISCONTINUED | OUTPATIENT
Start: 2018-12-25 | End: 2018-12-26 | Stop reason: HOSPADM

## 2018-12-25 RX ORDER — INSULIN ASPART 100 [IU]/ML
0-5 INJECTION, SOLUTION INTRAVENOUS; SUBCUTANEOUS
Status: DISCONTINUED | OUTPATIENT
Start: 2018-12-25 | End: 2018-12-26 | Stop reason: HOSPADM

## 2018-12-25 RX ORDER — NAPROXEN SODIUM 220 MG/1
162 TABLET, FILM COATED ORAL
Status: COMPLETED | OUTPATIENT
Start: 2018-12-25 | End: 2018-12-25

## 2018-12-25 RX ORDER — AMLODIPINE BESYLATE 5 MG/1
10 TABLET ORAL DAILY
Status: DISCONTINUED | OUTPATIENT
Start: 2018-12-26 | End: 2018-12-26 | Stop reason: HOSPADM

## 2018-12-25 RX ORDER — DOXAZOSIN 1 MG/1
2 TABLET ORAL DAILY
Status: DISCONTINUED | OUTPATIENT
Start: 2018-12-26 | End: 2018-12-26 | Stop reason: HOSPADM

## 2018-12-25 RX ORDER — FUROSEMIDE 10 MG/ML
20 INJECTION INTRAMUSCULAR; INTRAVENOUS 2 TIMES DAILY
Status: DISCONTINUED | OUTPATIENT
Start: 2018-12-26 | End: 2018-12-26

## 2018-12-25 RX ORDER — IBUPROFEN 200 MG
16 TABLET ORAL
Status: DISCONTINUED | OUTPATIENT
Start: 2018-12-25 | End: 2018-12-26 | Stop reason: HOSPADM

## 2018-12-25 RX ORDER — LISINOPRIL 20 MG/1
40 TABLET ORAL DAILY
Status: DISCONTINUED | OUTPATIENT
Start: 2018-12-26 | End: 2018-12-26 | Stop reason: HOSPADM

## 2018-12-25 RX ADMIN — FUROSEMIDE 40 MG: 10 INJECTION, SOLUTION INTRAMUSCULAR; INTRAVENOUS at 03:12

## 2018-12-25 RX ADMIN — HYDRALAZINE HYDROCHLORIDE 25 MG: 25 TABLET ORAL at 04:12

## 2018-12-25 RX ADMIN — ASPIRIN 81 MG 162 MG: 81 TABLET ORAL at 04:12

## 2018-12-25 NOTE — ED TRIAGE NOTES
Pt arrives to er via personal vehicle with daughter in law. Pt aaox4. States sob since Sunday, states tried to stick her head in freezer and states it did help. Denies chest pain, numbness/tingling, pt states noticed her feet starting to swell which happen every now and then.

## 2018-12-25 NOTE — ED PROVIDER NOTES
Encounter Date: 12/25/2018    This is a SORT/MSE of a 78 y.o. female presenting to the ED with c/o shortness of breath for several days. PARRY. Care will be transferred to an alternate provider when patient is roomed for a full evaluation and final disposition. RANDY Lazar, FNP-C 12/25/2018 3:15 PM    SCRIBE #1 NOTE: I, Emilia Beckman, am scribing for, and in the presence of,  Devin Jauregui MD. I have scribed the following portions of the note - Other sections scribed: HPI, ROS, PEx, and MDM.       History     Chief Complaint   Patient presents with    Shortness of Breath     Reports becoming SOB starting a few days ago. denies CP, dizziness reports being sent by urgent care     CC: Shortness of Breath    HPI: This 78 y.o. Female with PMHx of chronic kidney disease, stage III (moderate), DJD, Dyslipidemia, Essential hypertension, benign, Obesity, PVD, and Type II or unspecified type diabetes mellitus without mention of complication, uncontrolled presents to the ED c/o 3 day hx of shortness of breath. SOB is exacerbated when walking and/or laying flat on her back. Pt uses 3 pillows to sleep. Pt reports 3 day hx of bilateral lower extremity swelling. Pt denies fever, diaphoresis, congestion, chest pain, abdominal pain, and any other associated symptoms. Patient was previously on Lasix for some type of fluid retention but was taken off a year ago due to it causing issues with her kidneys.      The history is provided by the patient and a relative. No  was used.     Review of patient's allergies indicates:   Allergen Reactions    Codeine     Latex, natural rubber Itching    Sulfa (sulfonamide antibiotics)      Past Medical History:   Diagnosis Date    Chronic kidney disease, stage III (moderate)     DJD (degenerative joint disease)     Dyslipidemia     Essential hypertension, benign     Obesity     PVD (peripheral vascular disease)     Type II or unspecified type diabetes mellitus without  mention of complication, uncontrolled      Past Surgical History:   Procedure Laterality Date    ARTHROPLASTY, HIP TOTAL Left 6/29/2018    Performed by Onel [facility]ager [facility] at [facility] OR    EYE SURGERY      cataracts    glandular surgery      HYSTERECTOMY      TOTAL KNEE ARTHROPLASTY      left and right     Family History   Problem Relation Age of Onset    Diabetes Mother     Diabetes Sister     Diabetes Brother      Social History     Tobacco Use    Smoking status: Former Smoker     Types: Cigarettes    Smokeless tobacco: Never Used   Substance Use Topics    Alcohol use: No    Drug use: No     Review of Systems   Constitutional: Negative for chills, diaphoresis and fever.   HENT: Negative for congestion, ear pain and sore throat.    Eyes: Negative for pain.   Respiratory: Positive for shortness of breath. Negative for cough.    Cardiovascular: Positive for leg swelling (bilateral). Negative for chest pain.   Gastrointestinal: Negative for abdominal pain, diarrhea, nausea and vomiting.   Genitourinary: Negative for dysuria.   Musculoskeletal: Negative for back pain.   Skin: Negative for rash.   Neurological: Negative for headaches.       Physical Exam     Initial Vitals [12/25/18 1516]   BP Pulse Resp Temp SpO2   (!) 210/90 98 (!) 22 98.8 °F (37.1 °C) (!) 94 %      MAP       --         Physical Exam    Nursing note and vitals reviewed.  Constitutional: She is not diaphoretic. No distress.   HENT:   Head: Normocephalic and atraumatic.   Nose: Nose normal.   Mouth/Throat: Oropharynx is clear and moist.   Eyes: EOM are normal. Pupils are equal, round, and reactive to light.   Neck: Normal range of motion.   Cardiovascular: Normal rate, regular rhythm and intact distal pulses.   see documented heart rate and blood pressure,     Radial pulses 2+ equal DP pulses 2+ and equal   Pulmonary/Chest: Breath sounds normal. No respiratory distress. She has no wheezes. She has no rhonchi. She has no rales.   Abdominal: Soft.  She exhibits no distension. There is no tenderness. There is no rebound and no guarding.   Musculoskeletal: She exhibits edema (2 to 3+ pitting edema to bilateral lower extremity).   Neurological: She is alert and oriented to person, place, and time. She has normal strength.   No obvious focal deficit   Skin: Skin is warm.   Psychiatric: She has a normal mood and affect.         ED Course   Procedures  Labs Reviewed   CBC W/ AUTO DIFFERENTIAL - Abnormal; Notable for the following components:       Result Value    RBC 3.47 (*)     Hemoglobin 10.1 (*)     Hematocrit 31.7 (*)     MCHC 31.9 (*)     RDW 15.8 (*)     Gran% 73.2 (*)     All other components within normal limits   COMPREHENSIVE METABOLIC PANEL - Abnormal; Notable for the following components:    Glucose 130 (*)     BUN, Bld 24 (*)     ALT 8 (*)     All other components within normal limits   TROPONIN I - Abnormal; Notable for the following components:    Troponin I 0.034 (*)     All other components within normal limits   B-TYPE NATRIURETIC PEPTIDE - Abnormal; Notable for the following components:     (*)     All other components within normal limits   PROTIME-INR   URINALYSIS, REFLEX TO URINE CULTURE    Narrative:     Preferred Collection Type->Urine, Clean Catch   APTT     EKG Readings: (Independently Interpreted)   EKG done at 3:18 p.m. showing normal sinus rhythm occasional PVCs.  Left axis deviation.  Appears to be an old anterior septal infarct.  No ST elevation and normal T-waves.  Abnormal but nonspecific EKG.       Imaging Results          X-Ray Chest PA And Lateral (Final result)  Result time 12/25/18 16:38:06    Final result by Tonie Orourke MD (12/25/18 16:38:06)                 Impression:      1.  Mild pulmonary vascular congestion and probable interstitial edema.    2.  Blunting of the costophrenic angles suggesting trace pleural fluid.      Electronically signed by: Tonie Orourke MD  Date:    12/25/2018  Time:    16:38              Narrative:    EXAMINATION:  XR CHEST PA AND LATERAL    CLINICAL HISTORY:  shortness of breath;    TECHNIQUE:  PA and lateral views of the chest were performed.    COMPARISON:  Prior dated 12/02/2012    FINDINGS:  The mediastinal structures are midline.  The cardiac silhouette is not enlarged.  There is atherosclerotic calcification of the aortic arch.  There is mild pulmonary vascular congestion and probable interstitial edema.  No focal consolidation is seen.  There is blunting of the costophrenic angles suggesting trace pleural fluid.                                 Medical Decision Making:   Initial Assessment:   Pt presenting 2/2 shortness of breath and marked good pitting edema of her lower extremities consistent with likely CHF exacerbation.  Patient is unable to lay flat without becoming tachypneic with respiratory difficulty.  Labs, EKG, chest x-ray ordered for the patient.  IV Lasix ordered for the patient.  Blood pressure on arrival was 210/90 but on recheck was 167/90.  Will check end organs for any end organ damage to consider for hypertensive emergency albeit I think this is less likely.    Also considered but less likely:  Acs: ekg doesn't show stemi. Troponin ordered  Pna: symptoms bilaterally and no fevers.   Bronchitis: considered but hpi most c/w copd  COPD:  Considered.  Patient has 30 year pack year history but not wheezing.  Pneumothorax: bilateral breath sounds    Labs notable for elevated troponin and BNP (300s for BNP which is likely falsely low due to obesity of patient).  Chest x-ray showed pulmonary edema and bilateral pleural effusions.  Patient is urinating after IV Lasix.  Gave p.o. hydralazine due to blood pressure systolic is being 160s 180s.  Will admit the patient to the hospitalist service for further workup and management of likely new onset heart failure.  Patient was given aspirin.  I do think this troponin elevation is due to a CHF troponin leak as opposed to ACS.   Spoke with August with hospitalist team and admitted for observation.  Courtesy admission orders placed.     Clinical Tests:   Lab Tests: Ordered and Reviewed  Radiological Study: Reviewed and Ordered  Medical Tests: Ordered and Reviewed            Scribe Attestation:   Scribe #1: I performed the above scribed service and the documentation accurately describes the services I performed. I attest to the accuracy of the note.    Attending Attestation:           Physician Attestation for Scribe:  Physician Attestation Statement for Scribe #1: I, Devin Jauregui MD, reviewed documentation, as scribed by Emilia Beckman in my presence, and it is both accurate and complete.                    Clinical Impression:   The primary encounter diagnosis was Acute on chronic congestive heart failure, unspecified heart failure type. Diagnoses of Shortness of breath, Elevated troponin, and Peripheral edema were also pertinent to this visit.                             Devin Jauregui MD  12/25/18 8826

## 2018-12-26 VITALS
SYSTOLIC BLOOD PRESSURE: 141 MMHG | TEMPERATURE: 98 F | OXYGEN SATURATION: 95 % | HEART RATE: 79 BPM | HEIGHT: 65 IN | BODY MASS INDEX: 40.52 KG/M2 | RESPIRATION RATE: 18 BRPM | WEIGHT: 243.19 LBS | DIASTOLIC BLOOD PRESSURE: 61 MMHG

## 2018-12-26 PROBLEM — R79.89 ELEVATED TROPONIN: Status: ACTIVE | Noted: 2018-12-26

## 2018-12-26 LAB
ALBUMIN SERPL BCP-MCNC: 3.5 G/DL
ALP SERPL-CCNC: 55 U/L
ALT SERPL W/O P-5'-P-CCNC: 9 U/L
ANION GAP SERPL CALC-SCNC: 9 MMOL/L
AORTIC ROOT ANNULUS: 3.03 CM
AORTIC VALVE CUSP SEPERATION: 1.4 CM
AST SERPL-CCNC: 11 U/L
AV INDEX (PROSTH): 0.35
AV MEAN GRADIENT: 25.76 MMHG
AV PEAK GRADIENT: 38.44 MMHG
AV VALVE AREA: 0.96 CM2
BASOPHILS # BLD AUTO: 0.03 K/UL
BASOPHILS NFR BLD: 0.3 %
BILIRUB SERPL-MCNC: 0.5 MG/DL
BSA FOR ECHO PROCEDURE: 2.26 M2
BUN SERPL-MCNC: 24 MG/DL
CALCIUM SERPL-MCNC: 9.6 MG/DL
CHLORIDE SERPL-SCNC: 101 MMOL/L
CO2 SERPL-SCNC: 31 MMOL/L
CREAT SERPL-MCNC: 0.9 MG/DL
CV ECHO LV RWT: 0.4 CM
CV STRESS BASE HR: 83
DIASTOLIC BLOOD PRESSURE: 72
DIFFERENTIAL METHOD: ABNORMAL
DOP CALC AO PEAK VEL: 3.1 M/S
DOP CALC AO VTI: 75.41 CM
DOP CALC LVOT AREA: 2.72 CM2
DOP CALC LVOT DIAMETER: 1.86 CM
DOP CALC LVOT STROKE VOLUME: 72.38 CM3
DOP CALCLVOT PEAK VEL VTI: 26.65 CM
E WAVE DECELERATION TIME: 153.19 MSEC
E/A RATIO: 1.06
E/E' RATIO: 19.86
ECHO LV POSTERIOR WALL: 1.04 CM (ref 0.6–1.1)
EOSINOPHIL # BLD AUTO: 0.2 K/UL
EOSINOPHIL NFR BLD: 2.4 %
ERYTHROCYTE [DISTWIDTH] IN BLOOD BY AUTOMATED COUNT: 15.9 %
EST. GFR  (AFRICAN AMERICAN): >60 ML/MIN/1.73 M^2
EST. GFR  (NON AFRICAN AMERICAN): >60 ML/MIN/1.73 M^2
ESTIMATED AVG GLUCOSE: 108 MG/DL
FRACTIONAL SHORTENING: 19 % (ref 28–44)
GLUCOSE SERPL-MCNC: 109 MG/DL
HBA1C MFR BLD HPLC: 5.4 %
HCT VFR BLD AUTO: 33.4 %
HGB BLD-MCNC: 10.5 G/DL
INTERVENTRICULAR SEPTUM: 1.2 CM (ref 0.6–1.1)
IVRT: 0.09 MSEC
LA MAJOR: 4.65 CM
LA MINOR: 4.93 CM
LA WIDTH: 3.65 CM
LEFT ATRIUM SIZE: 3.9 CM
LEFT ATRIUM VOLUME INDEX: 26.9 ML/M2
LEFT ATRIUM VOLUME: 57.91 CM3
LEFT INTERNAL DIMENSION IN SYSTOLE: 4.18 CM (ref 2.1–4)
LEFT VENTRICLE DIASTOLIC VOLUME INDEX: 58.84 ML/M2
LEFT VENTRICLE DIASTOLIC VOLUME: 126.48 ML
LEFT VENTRICLE MASS INDEX: 103.6 G/M2
LEFT VENTRICLE SYSTOLIC VOLUME INDEX: 36.1 ML/M2
LEFT VENTRICLE SYSTOLIC VOLUME: 77.61 ML
LEFT VENTRICULAR INTERNAL DIMENSION IN DIASTOLE: 5.15 CM (ref 3.5–6)
LEFT VENTRICULAR MASS: 222.74 G
LV LATERAL E/E' RATIO: 19.86
LV SEPTAL E/E' RATIO: 19.86
LYMPHOCYTES # BLD AUTO: 2.2 K/UL
LYMPHOCYTES NFR BLD: 25.1 %
MCH RBC QN AUTO: 28.8 PG
MCHC RBC AUTO-ENTMCNC: 31.4 G/DL
MCV RBC AUTO: 92 FL
MONOCYTES # BLD AUTO: 0.7 K/UL
MONOCYTES NFR BLD: 8.1 %
MV PEAK A VEL: 1.31 M/S
MV PEAK E VEL: 1.39 M/S
NEUTROPHILS # BLD AUTO: 5.6 K/UL
NEUTROPHILS NFR BLD: 64.1 %
OHS CV CPX 85 PERCENT MAX PREDICTED HEART RATE MALE: 115
OHS CV CPX MAX PREDICTED HEART RATE: 136
OHS CV CPX PATIENT IS FEMALE: 1
OHS CV CPX PATIENT IS MALE: 0
OHS CV CPX PEAK DIASTOLIC BLOOD PRESSURE: 69 MMHG
OHS CV CPX PEAK HEAR RATE: 100
OHS CV CPX PEAK RATE PRESSURE PRODUCT: NORMAL
OHS CV CPX PEAK SYSTOLIC BLOOD PRESSURE: 138
OHS CV CPX PERCENT MAX PREDICTED HEART RATE ACHIEVED: 74
OHS CV CPX RATE PRESSURE PRODUCT PRESENTING: NORMAL
PISA TR MAX VEL: 2.21 M/S
PLATELET # BLD AUTO: 240 K/UL
PMV BLD AUTO: 10.8 FL
POCT GLUCOSE: 111 MG/DL (ref 70–110)
POCT GLUCOSE: 132 MG/DL (ref 70–110)
POCT GLUCOSE: 160 MG/DL (ref 70–110)
POTASSIUM SERPL-SCNC: 4.4 MMOL/L
PROT SERPL-MCNC: 6.7 G/DL
PV PEAK S VEL: 0.53 M/S
PV PEAK VELOCITY: 1.17 CM/S
RA MAJOR: 3.99 CM
RA PRESSURE: 3 MMHG
RA WIDTH: 3.08 CM
RBC # BLD AUTO: 3.64 M/UL
RIGHT VENTRICULAR END-DIASTOLIC DIMENSION: 2.57 CM
SINUS: 2.58 CM
SODIUM SERPL-SCNC: 141 MMOL/L
STJ: 1.93 CM
SYSTOLIC BLOOD PRESSURE: 146
TDI LATERAL: 0.07
TDI SEPTAL: 0.07
TDI: 0.07
TR MAX PG: 19.54 MMHG
TRICUSPID ANNULAR PLANE SYSTOLIC EXCURSION: 1.95 CM
TV REST PULMONARY ARTERY PRESSURE: 23 MMHG
WBC # BLD AUTO: 8.66 K/UL

## 2018-12-26 PROCEDURE — G0378 HOSPITAL OBSERVATION PER HR: HCPCS

## 2018-12-26 PROCEDURE — 63600175 PHARM REV CODE 636 W HCPCS: Performed by: PHYSICIAN ASSISTANT

## 2018-12-26 PROCEDURE — 25000003 PHARM REV CODE 250: Performed by: PHYSICIAN ASSISTANT

## 2018-12-26 PROCEDURE — 96376 TX/PRO/DX INJ SAME DRUG ADON: CPT | Mod: 59

## 2018-12-26 PROCEDURE — 63600175 PHARM REV CODE 636 W HCPCS: Performed by: NURSE PRACTITIONER

## 2018-12-26 PROCEDURE — 99204 OFFICE O/P NEW MOD 45 MIN: CPT | Mod: 25,,, | Performed by: INTERNAL MEDICINE

## 2018-12-26 PROCEDURE — 80053 COMPREHEN METABOLIC PANEL: CPT

## 2018-12-26 PROCEDURE — 85025 COMPLETE CBC W/AUTO DIFF WBC: CPT

## 2018-12-26 PROCEDURE — 25000003 PHARM REV CODE 250: Performed by: EMERGENCY MEDICINE

## 2018-12-26 PROCEDURE — 36415 COLL VENOUS BLD VENIPUNCTURE: CPT

## 2018-12-26 RX ORDER — REGADENOSON 0.08 MG/ML
INJECTION, SOLUTION INTRAVENOUS
Status: COMPLETED
Start: 2018-12-26 | End: 2018-12-26

## 2018-12-26 RX ORDER — FUROSEMIDE 10 MG/ML
40 INJECTION INTRAMUSCULAR; INTRAVENOUS 2 TIMES DAILY
Status: DISCONTINUED | OUTPATIENT
Start: 2018-12-26 | End: 2018-12-26 | Stop reason: HOSPADM

## 2018-12-26 RX ORDER — FUROSEMIDE 20 MG/1
20 TABLET ORAL 2 TIMES DAILY
Qty: 60 TABLET | Refills: 1 | Status: SHIPPED | OUTPATIENT
Start: 2018-12-26 | End: 2019-12-26

## 2018-12-26 RX ORDER — FUROSEMIDE 10 MG/ML
40 INJECTION INTRAMUSCULAR; INTRAVENOUS 2 TIMES DAILY
Status: DISCONTINUED | OUTPATIENT
Start: 2018-12-26 | End: 2018-12-26

## 2018-12-26 RX ADMIN — FUROSEMIDE 40 MG: 10 INJECTION, SOLUTION INTRAMUSCULAR; INTRAVENOUS at 03:12

## 2018-12-26 RX ADMIN — LISINOPRIL 40 MG: 20 TABLET ORAL at 08:12

## 2018-12-26 RX ADMIN — FUROSEMIDE 20 MG: 10 INJECTION, SOLUTION INTRAMUSCULAR; INTRAVENOUS at 08:12

## 2018-12-26 RX ADMIN — AMLODIPINE BESYLATE 10 MG: 5 TABLET ORAL at 08:12

## 2018-12-26 RX ADMIN — ASPIRIN 81 MG: 81 TABLET, COATED ORAL at 08:12

## 2018-12-26 RX ADMIN — DOXAZOSIN 2 MG: 1 TABLET ORAL at 08:12

## 2018-12-26 RX ADMIN — ATORVASTATIN CALCIUM 10 MG: 10 TABLET, FILM COATED ORAL at 08:12

## 2018-12-26 NOTE — H&P
[facility] Medicine  History & Physical    Patient Name: Lakesha Lorenzana  MRN: [id number]  Admission Date: 12/25/2018  Attending Physician: Jenna Hahn MD   Primary Care Provider: Anila Agustin MD         Patient information was obtained from patient and ER records.     Subjective:     Principal Problem:Acute on chronic congestive heart failure    Chief Complaint:   Chief Complaint   Patient presents with    Shortness of Breath     Reports becoming SOB starting a few days ago. denies CP, dizziness reports being sent by urgent care        HPI: Lakesha Lorenzana 78 y.o. with HLD, DM2, and HTN presents to the hospital with a chief complaint of SOB. She reports beginning Sunday she has begun to experience SOB worsened with laying flat and worst at night. For the last few days she has been having to sleep upright in her recliner to breathe. She reports she also began to notice both legs swelling at this time. Her symptoms improved with IV lasix in the ED. She denies history of CHF. States she follows with Dr. Torres for cardiology and has stress tests/echo in the past within normal limit. She endorses SOB, orthopnea, and lower extremity edema. She denies fever, chest pain, cough, N/V/D, abdominal pain, dysuria, syncope, dizziness/lightheadedness.     In the ED, troponin 0.034, , chest x-ray with congestion, intially HTN to 210, but decreased with PO hydralazine.     Past Medical History:   Diagnosis Date    Chronic kidney disease, stage III (moderate)     DJD (degenerative joint disease)     Dyslipidemia     Essential hypertension, benign     Obesity     PVD (peripheral vascular disease)     Type II or unspecified type diabetes mellitus without mention of complication, uncontrolled        Past Surgical History:   Procedure Laterality Date    ARTHROPLASTY, HIP TOTAL Left 6/29/2018    [facility]formed [facility]    EYE SURGERY      cataracts     glandular surgery      HYSTERECTOMY      TOTAL KNEE ARTHROPLASTY      left and right       Review of patient's allergies indicates:   Allergen Reactions    Codeine     Latex, natural rubber Itching    Sulfa (sulfonamide antibiotics)        No current facility-administered medications on file prior to encounter.      Current Outpatient Medications on File Prior to Encounter   Medication Sig    amlodipine (NORVASC) 10 MG tablet Take 1 tablet (10 mg total) by mouth once daily.    aspirin (ECOTRIN) 81 MG EC tablet Take 81 mg by mouth once daily. Instructed to stop 7 days prior to surgery per Dr. Hager    atorvastatin (LIPITOR) 10 MG tablet Take 1 tablet (10 mg total) by mouth once daily.    calcium citrate-vitamin D3 (CITRACAL + D MAXIMUM) 315-250 mg-unit Tab Take 315 mg by mouth 2 (two) times daily.    doxazosin (CARDURA) 2 MG tablet Take 1 tablet (2 mg total) by mouth once daily.    ergocalciferol (ERGOCALCIFEROL) 50,000 unit Cap Take 50,000 Units by mouth every 30 days.    glimepiride (AMARYL) 4 MG tablet Take 1 tablet (4 mg total) by mouth 2 (two) times daily. 1 tablet with breakfast, half a tablet every evening (Patient taking differently: Take 2 mg by mouth daily with breakfast. 1 tablet with breakfast, half a tablet every evening)    linagliptin (TRADJENTA) 5 mg Tab tablet Take 5 mg by mouth once daily.    lisinopril (PRINIVIL,ZESTRIL) 40 MG tablet Take 1 tablet (40 mg total) by mouth once daily.    metFORMIN (GLUCOPHAGE) 1000 MG tablet Take 1,000 mg by mouth 2 (two) times daily with meals.    oxyCODONE-acetaminophen (PERCOCET) 5-325 mg per tablet Take 1-2 every 4-6 hours    sitagliptan-metformin (JANUMET) 50-1,000 mg per tablet Take 1 tablet by mouth 2 (two) times daily with meals.     Family History     Problem Relation (Age of Onset)    Diabetes Mother, Sister, Brother        Tobacco Use    Smoking status: Former Smoker     Types: Cigarettes    Smokeless tobacco: Never Used   Substance and  Sexual Activity    Alcohol use: No    Drug use: No    Sexual activity: Not on file     Review of Systems   Constitutional: Negative for chills and fever.   HENT: Negative for nosebleeds and tinnitus.    Eyes: Negative for photophobia and visual disturbance.   Respiratory: Positive for shortness of breath. Negative for wheezing.    Cardiovascular: Positive for leg swelling. Negative for chest pain and palpitations.        Orthopnea   Gastrointestinal: Negative for abdominal distention, abdominal pain, diarrhea, nausea and vomiting.   Genitourinary: Negative for dysuria, flank pain and hematuria.   Musculoskeletal: Negative for gait problem and joint swelling.   Skin: Negative for rash and wound.   Neurological: Negative for dizziness, seizures, syncope and light-headedness.     Objective:     Vital Signs (Most Recent):  Temp: 98 °F (36.7 °C) (12/25/18 2026)  Pulse: 84 (12/25/18 2026)  Resp: 19 (12/25/18 2026)  BP: (!) 157/67 (12/25/18 2026)  SpO2: 99 % (12/25/18 2026) Vital Signs (24h Range):  Temp:  [98 °F (36.7 °C)-98.8 °F (37.1 °C)] 98 °F (36.7 °C)  Pulse:  [84-98] 84  Resp:  [19-23] 19  SpO2:  [94 %-99 %] 99 %  BP: (134-210)/() 157/67     Weight: 111 kg (244 lb 12.8 oz)  Body mass index is 40.74 kg/m².    Physical Exam   Constitutional: She is oriented to person, place, and time. She appears well-developed and well-nourished. No distress.   On supplemental O2   HENT:   Head: Normocephalic and atraumatic.   Right Ear: External ear normal.   Left Ear: External ear normal.   Eyes: Conjunctivae and EOM are normal. Right eye exhibits no discharge. Left eye exhibits no discharge.   Neck: Normal range of motion. No thyromegaly present.   Cardiovascular: Normal rate, regular rhythm and intact distal pulses.   Murmur (systolic) heard.  Pulmonary/Chest: Effort normal and breath sounds normal. No respiratory distress.   Abdominal: Soft. Bowel sounds are normal. She exhibits no distension and no mass. There is no  tenderness.   Musculoskeletal: She exhibits edema. She exhibits no deformity.   2+ edema to mid shin bilaterally Patient reports improvement since lasix in ED   Neurological: She is alert and oriented to person, place, and time.   Skin: Skin is warm and dry.   Psychiatric: She has a normal mood and affect. Her behavior is normal.   Nursing note and vitals reviewed.        CRANIAL NERVES     CN III, IV, VI   Extraocular motions are normal.        Significant Labs:   CBC:   Recent Labs   Lab 12/25/18  1544   WBC 8.02   HGB 10.1*   HCT 31.7*        CMP:   Recent Labs   Lab 12/25/18  1544      K 4.1      CO2 26   *   BUN 24*   CREATININE 0.9   CALCIUM 9.6   PROT 6.5   ALBUMIN 3.6   BILITOT 0.4   ALKPHOS 65   AST 11   ALT 8*   ANIONGAP 13   EGFRNONAA >60     Cardiac Markers:   Recent Labs   Lab 12/25/18  1544   *     Coagulation:   Recent Labs   Lab 12/25/18  1544   INR 1.0   APTT 26.6     Troponin:   Recent Labs   Lab 12/25/18  1544   TROPONINI 0.034*     Urine Studies:   Recent Labs   Lab 12/25/18  1603   COLORU Yellow   APPEARANCEUA Clear   PHUR 6.0   SPECGRAV 1.020   PROTEINUA Negative   GLUCUA Negative   KETONESU Negative   BILIRUBINUA Negative   OCCULTUA Negative   NITRITE Negative   UROBILINOGEN Negative   LEUKOCYTESUR Negative       Significant Imaging:   Imaging Results          X-Ray Chest PA And Lateral (Final result)  Result time 12/25/18 16:38:06    Final result by Tonie Orourke MD (12/25/18 16:38:06)                 Impression:      1.  Mild pulmonary vascular congestion and probable interstitial edema.    2.  Blunting of the costophrenic angles suggesting trace pleural fluid.      Electronically signed by: Tonie Orourke MD  Date:    12/25/2018  Time:    16:38             Narrative:    EXAMINATION:  XR CHEST PA AND LATERAL    CLINICAL HISTORY:  shortness of breath;    TECHNIQUE:  PA and lateral views of the chest were performed.    COMPARISON:  Prior dated  12/02/2012    FINDINGS:  The mediastinal structures are midline.  The cardiac silhouette is not enlarged.  There is atherosclerotic calcification of the aortic arch.  There is mild pulmonary vascular congestion and probable interstitial edema.  No focal consolidation is seen.  There is blunting of the costophrenic angles suggesting trace pleural fluid.                                  Assessment/Plan:     * Acute on chronic congestive heart failure    Patient with lung exam free of rales, ,  chest x-ray with pulmonary vascular congestion.  Troponin 0.03. Symptoms today consistent with CHF exacerbation.   Echo pending No previous echo on file. Follows with cardiology outpt. Reports told no history of CHF. Stress at that time within normal limits.  Cardiology consulted  Continue home lisinopril, amlodipine, ASA  Daily weights  IV lasix-allergy of rubber, but patient received in ED without itching  Strict I&O's   Low salt cardiac diet  Cardiac monitoring     CKD (chronic kidney disease), stage II    Cr at baseline. Will continue to monitor in setting of ongoing diuresis. Renal dose medications. Avoid nephrotoxic drugs and repeat labs in AM     Type 2 diabetes mellitus with stage 2 chronic kidney disease, without long-term current use of insulin    BG of 130  Hold home metformin, and amaryl    A1c of 5.4 1 year ago  Dose adjusted insulin in hospital  Hypoglycemic protocol  POCT glucose checks  Diabetic diet     Systolic ejection murmur    Echo  Cardiology consulted     Dyslipidemia    Continue home atorvastatin     PVD (peripheral vascular disease)    Likely contributory to lower extremity edema. Chronic venous changes on exam.     Essential hypertension, benign    Hypertensive over course of admission.  SBP goal of <160 in hosp.  Continue home amlodipine, and doxazosin and lisinopril        VTE Risk Mitigation (From admission, onward)        Ordered     IP VTE HIGH RISK PATIENT  Once      12/25/18 [address] [place] [address]  sequential compression device  Until discontinued      12/25/18 1824     Place MARITA hose  Until discontinued      12/25/18 1824        VTE: MARITA/SCD  Code: full  Diet: diabetic cardiac  Dispo: pending diuresis and cardiology eval     August Rivas PA-C  Department of Hospital Medicine   [facility]

## 2018-12-26 NOTE — HPI
HPI: Lakesha Lorenzana 78 y.o. with HLD, DM2, and HTN presents to the hospital with a chief complaint of SOB. She reports beginning Sunday she has begun to experience SOB worsened with laying flat and worst at night. For the last few days she has been having to sleep upright in her recliner to breathe. She reports she also began to notice both legs swelling at this time. Her symptoms improved with IV lasix in the ED. She denies history of CHF. States she follows with Dr. Torres for cardiology and has stress tests/echo in the past within normal limit. She endorses SOB, orthopnea, and lower extremity edema. She denies fever, chest pain, cough, N/V/D, abdominal pain, dysuria, syncope, dizziness/lightheadedness.      In the ED, troponin 0.034,     Less SOB  Denies CP  EKG NSR PRWP - no acute STT changes

## 2018-12-26 NOTE — SUBJECTIVE & OBJECTIVE
Past Medical History:   Diagnosis Date    Chronic kidney disease, stage III (moderate)     DJD (degenerative joint disease)     Dyslipidemia     Essential hypertension, benign     Obesity     PVD (peripheral vascular disease)     Type II or unspecified type diabetes mellitus without mention of complication, uncontrolled        Past Surgical History:   Procedure Laterality Date    ARTHROPLASTY, HIP TOTAL Left 6/29/2018    Performed by Onel Hager [facility] at [facility] OR    EYE SURGERY      cataracts    glandular surgery      HYSTERECTOMY      TOTAL KNEE ARTHROPLASTY      left and right       Review of patient's allergies indicates:   Allergen Reactions    Codeine     Latex, natural rubber Itching    Sulfa (sulfonamide antibiotics)        No current facility-administered medications on file prior to encounter.      Current Outpatient Medications on File Prior to Encounter   Medication Sig    amlodipine (NORVASC) 10 MG tablet Take 1 tablet (10 mg total) by mouth once daily.    aspirin (ECOTRIN) 81 MG EC tablet Take 81 mg by mouth once daily. Instructed to stop 7 days prior to surgery per Dr. Hager    atorvastatin (LIPITOR) 10 MG tablet Take 1 tablet (10 mg total) by mouth once daily.    calcium citrate-vitamin D3 (CITRACAL + D MAXIMUM) 315-250 mg-unit Tab Take 315 mg by mouth 2 (two) times daily.    doxazosin (CARDURA) 2 MG tablet Take 1 tablet (2 mg total) by mouth once daily.    ergocalciferol (ERGOCALCIFEROL) 50,000 unit Cap Take 50,000 Units by mouth every 30 days.    glimepiride (AMARYL) 4 MG tablet Take 1 tablet (4 mg total) by mouth 2 (two) times daily. 1 tablet with breakfast, half a tablet every evening (Patient taking differently: Take 2 mg by mouth daily with breakfast. 1 tablet with breakfast, half a tablet every evening)    linagliptin (TRADJENTA) 5 mg Tab tablet Take 5 mg by mouth once daily.    lisinopril (PRINIVIL,ZESTRIL) 40 MG tablet Take 1 tablet (40 mg total) by mouth once daily.     metFORMIN (GLUCOPHAGE) 1000 MG tablet Take 1,000 mg by mouth 2 (two) times daily with meals.    oxyCODONE-acetaminophen (PERCOCET) 5-325 mg per tablet Take 1-2 every 4-6 hours    sitagliptan-metformin (JANUMET) 50-1,000 mg per tablet Take 1 tablet by mouth 2 (two) times daily with meals.     Family History     Problem Relation (Age of Onset)    Diabetes Mother, Sister, Brother        Tobacco Use    Smoking status: Former Smoker     Types: Cigarettes    Smokeless tobacco: Never Used   Substance and Sexual Activity    Alcohol use: No    Drug use: No    Sexual activity: Not on file     Review of Systems   Constitution: Negative for decreased appetite.   HENT: Negative for ear discharge.    Eyes: Negative for blurred vision.   Respiratory: Negative for hemoptysis.    Endocrine: Negative for polyphagia.   Hematologic/Lymphatic: Negative for adenopathy.   Skin: Negative for color change.   Musculoskeletal: Negative for joint swelling.   Neurological: Negative for brief paralysis.   Psychiatric/Behavioral: Negative for hallucinations.     Objective:     Vital Signs (Most Recent):  Temp: 98.3 °F (36.8 °C) (12/26/18 0724)  Pulse: 77 (12/26/18 0724)  Resp: 18 (12/26/18 0724)  BP: (!) 150/71 (12/26/18 0724)  SpO2: 98 % (12/26/18 0724) Vital Signs (24h Range):  Temp:  [98 °F (36.7 °C)-98.8 °F (37.1 °C)] 98.3 °F (36.8 °C)  Pulse:  [75-98] 77  Resp:  [18-23] 18  SpO2:  [94 %-99 %] 98 %  BP: (132-210)/() 150/71     Weight: 110.3 kg (243 lb 2.7 oz)  Body mass index is 40.47 kg/m².    SpO2: 98 %  O2 Device (Oxygen Therapy): nasal cannula      Intake/Output Summary (Last 24 hours) at 12/26/2018 1020  Last data filed at 12/26/2018 0533  Gross per 24 hour   Intake --   Output 1000 ml   Net -1000 ml       Lines/Drains/Airways     Drain                 Drain/Device  06/29/18 0819 Left lateral hip other (see comments) 180 days          Airway                 Airway - Non-Surgical 06/29/18 0640 Nasal Cannula 180 days           Peripheral Intravenous Line                 Peripheral IV - Single Lumen 12/25/18 1544 Right Antecubital less than 1 day                Physical Exam   Constitutional: She is oriented to person, place, and time. She appears well-developed and well-nourished.   HENT:   Head: Normocephalic and atraumatic.   Eyes: Conjunctivae are normal. Pupils are equal, round, and reactive to light.   Neck: Normal range of motion. Neck supple.   Cardiovascular: Normal rate, normal heart sounds and intact distal pulses.   Pulmonary/Chest: Effort normal and breath sounds normal.   Abdominal: Soft. Bowel sounds are normal.   Musculoskeletal: Normal range of motion. She exhibits edema.   Neurological: She is alert and oriented to person, place, and time.   Skin: Skin is warm and dry.       Significant Labs: All pertinent lab results from the last 24 hours have been reviewed.    Significant Imaging: Echocardiogram: 2D echo with color flow doppler: No results found for this or any previous visit.

## 2018-12-26 NOTE — ASSESSMENT & PLAN NOTE
BG of 130  Hold home metformin, and amaryl    A1c of 5.4 1 year ago  Dose adjusted insulin in hospital  Hypoglycemic protocol  POCT glucose checks  Diabetic diet

## 2018-12-26 NOTE — PLAN OF CARE
To patient's room to discuss patient managing her care at home.      TN Role Explained.  Patient identified by using 2 identifiers:  Name and date of birth    Patient stated that her  WILL HELP AT HOME WITH her RECOVERY.      TN name and contact info placed on the communication board    Preferred Pharmacy:  [facility] [place], [address] [address]  Phone: [phone] Fax: [phone]       12/26/18 1218   Discharge Assessment   Assessment Type Discharge Planning Assessment   Confirmed/corrected address and phone number on facesheet? Yes   Assessment information obtained from? Patient   Expected Length of Stay (days) 2   Communicated expected length of stay with patient/caregiver yes   Prior to hospitilization cognitive status: Alert/Oriented   Prior to hospitalization functional status: Assistive Equipment   Current cognitive status: Alert/Oriented   Current Functional Status: Needs Assistance   Able to Return to Prior Arrangements yes   Is patient able to care for self after discharge? Yes   Patient's perception of discharge disposition home or selfcare   Readmission Within the Last 30 Days no previous admission in last 30 days   Patient currently being followed by outpatient case management? No   Patient currently receives any other outside agency services? No   Equipment Currently Used at Home rollator;walker, rolling;cane, quad   Do you have any problems affording any of your prescribed medications? No   Is the patient taking medications as prescribed? yes   Does the patient have transportation home? Yes   Transportation Anticipated family or friend will provide   Does the patient receive services at the [facility]? No   Discharge Plan A Home with family   Patient/Family in Agreement with Plan yes

## 2018-12-26 NOTE — NURSING
Received report form FRANK Lal RN. Assumed care of pt and pt AACx4 with no c/o .telemetry monitor in place. Saline lock in place to site is clear. Pt informed of plan of care and safety maintained with bed low side rails up x with nurse call bell within reach

## 2018-12-26 NOTE — SUBJECTIVE & OBJECTIVE
Past Medical History:   Diagnosis Date    Chronic kidney disease, stage III (moderate)     DJD (degenerative joint disease)     Dyslipidemia     Essential hypertension, benign     Obesity     PVD (peripheral vascular disease)     Type II or unspecified type diabetes mellitus without mention of complication, uncontrolled        Past Surgical History:   Procedure Laterality Date    ARTHROPLASTY, HIP TOTAL Left 6/29/2018    [facility]    EYE SURGERY      cataracts    glandular surgery      HYSTERECTOMY      TOTAL KNEE ARTHROPLASTY      left and right       Review of patient's allergies indicates:   Allergen Reactions    Codeine     Latex, natural rubber Itching    Sulfa (sulfonamide antibiotics)        No current facility-administered medications on file prior to encounter.      Current Outpatient Medications on File Prior to Encounter   Medication Sig    amlodipine (NORVASC) 10 MG tablet Take 1 tablet (10 mg total) by mouth once daily.    aspirin (ECOTRIN) 81 MG EC tablet Take 81 mg by mouth once daily. Instructed to stop 7 days prior to surgery per Dr. Hager    atorvastatin (LIPITOR) 10 MG tablet Take 1 tablet (10 mg total) by mouth once daily.    calcium citrate-vitamin D3 (CITRACAL + D MAXIMUM) 315-250 mg-unit Tab Take 315 mg by mouth 2 (two) times daily.    doxazosin (CARDURA) 2 MG tablet Take 1 tablet (2 mg total) by mouth once daily.    ergocalciferol (ERGOCALCIFEROL) 50,000 unit Cap Take 50,000 Units by mouth every 30 days.    glimepiride (AMARYL) 4 MG tablet Take 1 tablet (4 mg total) by mouth 2 (two) times daily. 1 tablet with breakfast, half a tablet every evening (Patient taking differently: Take 2 mg by mouth daily with breakfast. 1 tablet with breakfast, half a tablet every evening)    linagliptin (TRADJENTA) 5 mg Tab tablet Take 5 mg by mouth once daily.    lisinopril (PRINIVIL,ZESTRIL) 40 MG tablet Take 1 tablet (40 mg total) by mouth once daily.     metFORMIN (GLUCOPHAGE) 1000 MG tablet Take 1,000 mg by mouth 2 (two) times daily with meals.    oxyCODONE-acetaminophen (PERCOCET) 5-325 mg per tablet Take 1-2 every 4-6 hours    sitagliptan-metformin (JANUMET) 50-1,000 mg per tablet Take 1 tablet by mouth 2 (two) times daily with meals.     Family History     Problem Relation (Age of Onset)    Diabetes Mother, Sister, Brother        Tobacco Use    Smoking status: Former Smoker     Types: Cigarettes    Smokeless tobacco: Never Used   Substance and Sexual Activity    Alcohol use: No    Drug use: No    Sexual activity: Not on file     Review of Systems   Constitutional: Negative for chills and fever.   HENT: Negative for nosebleeds and tinnitus.    Eyes: Negative for photophobia and visual disturbance.   Respiratory: Positive for shortness of breath. Negative for wheezing.    Cardiovascular: Positive for leg swelling. Negative for chest pain and palpitations.        Orthopnea   Gastrointestinal: Negative for abdominal distention, abdominal pain, diarrhea, nausea and vomiting.   Genitourinary: Negative for dysuria, flank pain and hematuria.   Musculoskeletal: Negative for gait problem and joint swelling.   Skin: Negative for rash and wound.   Neurological: Negative for dizziness, seizures, syncope and light-headedness.     Objective:     Vital Signs (Most Recent):  Temp: 98 °F (36.7 °C) (12/25/18 2026)  Pulse: 84 (12/25/18 2026)  Resp: 19 (12/25/18 2026)  BP: (!) 157/67 (12/25/18 2026)  SpO2: 99 % (12/25/18 2026) Vital Signs (24h Range):  Temp:  [98 °F (36.7 °C)-98.8 °F (37.1 °C)] 98 °F (36.7 °C)  Pulse:  [84-98] 84  Resp:  [19-23] 19  SpO2:  [94 %-99 %] 99 %  BP: (134-210)/() 157/67     Weight: 111 kg (244 lb 12.8 oz)  Body mass index is 40.74 kg/m².    Physical Exam   Constitutional: She is oriented to person, place, and time. She appears well-developed and well-nourished. No distress.   On supplemental O2   HENT:   Head: Normocephalic and  atraumatic.   Right Ear: External ear normal.   Left Ear: External ear normal.   Eyes: Conjunctivae and EOM are normal. Right eye exhibits no discharge. Left eye exhibits no discharge.   Neck: Normal range of motion. No thyromegaly present.   Cardiovascular: Normal rate, regular rhythm and intact distal pulses.   Murmur (systolic) heard.  Pulmonary/Chest: Effort normal and breath sounds normal. No respiratory distress.   Abdominal: Soft. Bowel sounds are normal. She exhibits no distension and no mass. There is no tenderness.   Musculoskeletal: She exhibits edema. She exhibits no deformity.   2+ edema to mid shin bilaterally Patient reports improvement since lasix in ED   Neurological: She is alert and oriented to person, place, and time.   Skin: Skin is warm and dry.   Psychiatric: She has a normal mood and affect. Her behavior is normal.   Nursing note and vitals reviewed.        CRANIAL NERVES     CN III, IV, VI   Extraocular motions are normal.        Significant Labs:   CBC:   Recent Labs   Lab 12/25/18  1544   WBC 8.02   HGB 10.1*   HCT 31.7*        CMP:   Recent Labs   Lab 12/25/18  1544      K 4.1      CO2 26   *   BUN 24*   CREATININE 0.9   CALCIUM 9.6   PROT 6.5   ALBUMIN 3.6   BILITOT 0.4   ALKPHOS 65   AST 11   ALT 8*   ANIONGAP 13   EGFRNONAA >60     Cardiac Markers:   Recent Labs   Lab 12/25/18  1544   *     Coagulation:   Recent Labs   Lab 12/25/18  1544   INR 1.0   APTT 26.6     Troponin:   Recent Labs   Lab 12/25/18  1544   TROPONINI 0.034*     Urine Studies:   Recent Labs   Lab 12/25/18  1603   COLORU Yellow   APPEARANCEUA Clear   PHUR 6.0   SPECGRAV 1.020   PROTEINUA Negative   GLUCUA Negative   KETONESU Negative   BILIRUBINUA Negative   OCCULTUA Negative   NITRITE Negative   UROBILINOGEN Negative   LEUKOCYTESUR Negative       Significant Imaging:   Imaging Results          X-Ray Chest PA And Lateral (Final result)  Result time 12/25/18 16:38:06    Final result by  Tonie Orourke MD (12/25/18 16:38:06)                 Impression:      1.  Mild pulmonary vascular congestion and probable interstitial edema.    2.  Blunting of the costophrenic angles suggesting trace pleural fluid.      Electronically signed by: Tonie Orourke MD  Date:    12/25/2018  Time:    16:38             Narrative:    EXAMINATION:  XR CHEST PA AND LATERAL    CLINICAL HISTORY:  shortness of breath;    TECHNIQUE:  PA and lateral views of the chest were performed.    COMPARISON:  Prior dated 12/02/2012    FINDINGS:  The mediastinal structures are midline.  The cardiac silhouette is not enlarged.  There is atherosclerotic calcification of the aortic arch.  There is mild pulmonary vascular congestion and probable interstitial edema.  No focal consolidation is seen.  There is blunting of the costophrenic angles suggesting trace pleural fluid.

## 2018-12-26 NOTE — ASSESSMENT & PLAN NOTE
Patient with lung exam free of rales, ,  chest x-ray with pulmonary vascular congestion.  Troponin 0.03. Symptoms today consistent with CHF exacerbation.   Echo pending No previous echo on file. Follows with cardiology outpt. Reports told no history of CHF. Stress at that time within normal limits.  Cardiology consulted  Continue home lisinopril, amlodipine, ASA  Daily weights  IV lasix-allergy of rubber, but patient received in ED without itching  Strict I&O's   Low salt cardiac diet  Cardiac monitoring

## 2018-12-26 NOTE — ASSESSMENT & PLAN NOTE
Hypertensive over course of admission.  SBP goal of <160 in hosp.  Continue home amlodipine, and doxazosin and lisinopril

## 2018-12-26 NOTE — CONSULTS
[facility]  Cardiology  Consult Note    Patient Name: Lakesha Lorenzana  MRN: [id number]  Admission Date: 12/25/2018  Hospital Length of Stay: 0 days  Code Status: Full Code   Attending Provider: Jenna Hahn MD   Consulting Provider: Patel Finch MD  Primary Care Physician: Anila Agustin MD  Principal Problem:Acute on chronic congestive heart failure    Patient information was obtained from patient and ER records.     Consults  Subjective:     Chief Complaint:  CHF        HPI: Lakesha Lorenzana 78 y.o. with HLD, DM2, and HTN presents to the hospital with a chief complaint of SOB. She reports beginning Sunday she has begun to experience SOB worsened with laying flat and worst at night. For the last few days she has been having to sleep upright in her recliner to breathe. She reports she also began to notice both legs swelling at this time. Her symptoms improved with IV lasix in the ED. She denies history of CHF. States she follows with Dr. Torres for cardiology and has stress tests/echo in the past within normal limit. She endorses SOB, orthopnea, and lower extremity edema. She denies fever, chest pain, cough, N/V/D, abdominal pain, dysuria, syncope, dizziness/lightheadedness.      In the ED, troponin 0.034,     Less SOB  Denies CP  EKG NSR PRWP - no acute STT changes    Past Medical History:   Diagnosis Date    Chronic kidney disease, stage III (moderate)     DJD (degenerative joint disease)     Dyslipidemia     Essential hypertension, benign     Obesity     PVD (peripheral vascular disease)     Type II or unspecified type diabetes mellitus without mention of complication, uncontrolled        Past Surgical History:   Procedure Laterality Date    ARTHROPLASTY, HIP TOTAL Left 6/29/2018    [facility]el [facility] at [facility] OR    EYE SURGERY      cataracts    glandular surgery      HYSTERECTOMY      TOTAL KNEE ARTHROPLASTY      left and right       Review of  patient's allergies indicates:   Allergen Reactions    Codeine     Latex, natural rubber Itching    Sulfa (sulfonamide antibiotics)        No current facility-administered medications on file prior to encounter.      Current Outpatient Medications on File Prior to Encounter   Medication Sig    amlodipine (NORVASC) 10 MG tablet Take 1 tablet (10 mg total) by mouth once daily.    aspirin (ECOTRIN) 81 MG EC tablet Take 81 mg by mouth once daily. Instructed to stop 7 days prior to surgery per Dr. Hager    atorvastatin (LIPITOR) 10 MG tablet Take 1 tablet (10 mg total) by mouth once daily.    calcium citrate-vitamin D3 (CITRACAL + D MAXIMUM) 315-250 mg-unit Tab Take 315 mg by mouth 2 (two) times daily.    doxazosin (CARDURA) 2 MG tablet Take 1 tablet (2 mg total) by mouth once daily.    ergocalciferol (ERGOCALCIFEROL) 50,000 unit Cap Take 50,000 Units by mouth every 30 days.    glimepiride (AMARYL) 4 MG tablet Take 1 tablet (4 mg total) by mouth 2 (two) times daily. 1 tablet with breakfast, half a tablet every evening (Patient taking differently: Take 2 mg by mouth daily with breakfast. 1 tablet with breakfast, half a tablet every evening)    linagliptin (TRADJENTA) 5 mg Tab tablet Take 5 mg by mouth once daily.    lisinopril (PRINIVIL,ZESTRIL) 40 MG tablet Take 1 tablet (40 mg total) by mouth once daily.    metFORMIN (GLUCOPHAGE) 1000 MG tablet Take 1,000 mg by mouth 2 (two) times daily with meals.    oxyCODONE-acetaminophen (PERCOCET) 5-325 mg per tablet Take 1-2 every 4-6 hours    sitagliptan-metformin (JANUMET) 50-1,000 mg per tablet Take 1 tablet by mouth 2 (two) times daily with meals.     Family History     Problem Relation (Age of Onset)    Diabetes Mother, Sister, Brother        Tobacco Use    Smoking status: Former Smoker     Types: Cigarettes    Smokeless tobacco: Never Used   Substance and Sexual Activity    Alcohol use: No    Drug use: No    Sexual activity: Not on file     Review of  Systems   Constitution: Negative for decreased appetite.   HENT: Negative for ear discharge.    Eyes: Negative for blurred vision.   Respiratory: Negative for hemoptysis.    Endocrine: Negative for polyphagia.   Hematologic/Lymphatic: Negative for adenopathy.   Skin: Negative for color change.   Musculoskeletal: Negative for joint swelling.   Neurological: Negative for brief paralysis.   Psychiatric/Behavioral: Negative for hallucinations.     Objective:     Vital Signs (Most Recent):  Temp: 98.3 °F (36.8 °C) (12/26/18 0724)  Pulse: 77 (12/26/18 0724)  Resp: 18 (12/26/18 0724)  BP: (!) 150/71 (12/26/18 0724)  SpO2: 98 % (12/26/18 0724) Vital Signs (24h Range):  Temp:  [98 °F (36.7 °C)-98.8 °F (37.1 °C)] 98.3 °F (36.8 °C)  Pulse:  [75-98] 77  Resp:  [18-23] 18  SpO2:  [94 %-99 %] 98 %  BP: (132-210)/() 150/71     Weight: 110.3 kg (243 lb 2.7 oz)  Body mass index is 40.47 kg/m².    SpO2: 98 %  O2 Device (Oxygen Therapy): nasal cannula      Intake/Output Summary (Last 24 hours) at 12/26/2018 1020  Last data filed at 12/26/2018 0533  Gross per 24 hour   Intake --   Output 1000 ml   Net -1000 ml       Lines/Drains/Airways     Drain                 Drain/Device  06/29/18 0819 Left lateral hip other (see comments) 180 days          Airway                 Airway - Non-Surgical 06/29/18 0640 Nasal Cannula 180 days          Peripheral Intravenous Line                 Peripheral IV - Single Lumen 12/25/18 1544 Right Antecubital less than 1 day                Physical Exam   Constitutional: She is oriented to person, place, and time. She appears well-developed and well-nourished.   HENT:   Head: Normocephalic and atraumatic.   Eyes: Conjunctivae are normal. Pupils are equal, round, and reactive to light.   Neck: Normal range of motion. Neck supple.   Cardiovascular: Normal rate, normal heart sounds and intact distal pulses.   Pulmonary/Chest: Effort normal and breath sounds normal.   Abdominal: Soft. Bowel sounds are  normal.   Musculoskeletal: Normal range of motion. She exhibits edema.   Neurological: She is alert and oriented to person, place, and time.   Skin: Skin is warm and dry.       Significant Labs: All pertinent lab results from the last 24 hours have been reviewed.    Significant Imaging: Echocardiogram: 2D echo with color flow doppler: No results found for this or any previous visit.    Assessment and Plan:     * Acute on chronic congestive heart failure    Unknown baseline EF. Echo and stress test today. Diuresis and afterload reduction as tolerated     Elevated troponin    Denies CP. Likely demand ischemia from CHF. Stress test today     Non morbid obesity due to excess calories          Dyslipidemia          Essential hypertension, benign              VTE Risk Mitigation (From admission, onward)        Ordered     IP VTE HIGH RISK PATIENT  Once      12/25/18 1824     Place sequential compression device  Until discontinued      12/25/18 1824     Place MARITA hose  Until discontinued      12/25/18 1824          Thank you for your consult. I will follow-up with patient. Please contact us if you have any additional questions.    Patel Finch MD  Cardiology   [facility]

## 2018-12-26 NOTE — NURSING
Pt arrive to the unit by stretcher accompanied by transport. Assisted pt to bed. Tele monitoring initiated.  Admit assessment initiated.  Pt is AAOx4.  No distress noted.

## 2018-12-26 NOTE — PLAN OF CARE
Problem: Fall Injury Risk  Goal: Absence of Fall and Fall-Related Injury  Outcome: Ongoing (interventions implemented as appropriate)  Intervention: Identify and Manage Contributors to Fall Injury Risk   12/26/18 0656   Manage Acute Allergic Reaction   Medication Review/Management medications reviewed;high risk medications identified   Identify and Manage Contributors to Fall Injury Risk   Self-Care Promotion independence encouraged;BADL personal routines maintained     Intervention: Promote Injury-Free Environment   12/26/18 0656   Optimize [place] and Functional Mobility   Environmental Safety Modification assistive device/personal items within reach;clutter free environment maintained;lighting adjusted;room near unit station   Optimize Balance and Safe Activity   Safety Promotion/Fall Prevention medications reviewed;side rails raised x 2         Problem: Adult Inpatient Plan of Care  Goal: Plan of Care Review  Outcome: Ongoing (interventions implemented as appropriate)   12/26/18 0656   Plan of Care Review   Plan of Care Reviewed With patient     Goal: Absence of Hospital-Acquired Illness or Injury  Outcome: Ongoing (interventions implemented as appropriate)  Intervention: Identify and Manage Fall Risk   12/26/18 0656   Optimize Balance and Safe Activity   Safety Promotion/Fall Prevention medications reviewed;side rails raised x 2         Problem: Fluid Imbalance (Heart Failure)  Goal: Fluid Balance  Outcome: Ongoing (interventions implemented as appropriate)  Intervention: Monitor and Manage Fluid Balance   12/26/18 0656   Monitor and Manage Cardiac Rhythm Effects   Fluid/Electrolyte Management fluids restricted;intravenous fluids adjusted;intravenous fluid replacement initiated         Comments: Pt had no c/o discomfort throughout the shift. Pt rested comfortably at bedside

## 2018-12-26 NOTE — HPI
Lakesha Lorenzana 78 y.o. with HLD, DM2, and HTN presents to the hospital with a chief complaint of SOB. She reports beginning Sunday she has begun to experience SOB worsened with laying flat and worst at night. For the last few days she has been having to sleep upright in her recliner to breathe. She reports she also began to notice both legs swelling at this time. Her symptoms improved with IV lasix in the ED. She denies history of CHF. States she follows with Dr. Torres for cardiology and has stress tests/echo in the past within normal limit. She endorses SOB, orthopnea, and lower extremity edema. She denies fever, chest pain, cough, N/V/D, abdominal pain, dysuria, syncope, dizziness/lightheadedness.     In the ED, troponin 0.034, , chest x-ray with congestion, intially HTN to 210, but decreased with PO hydralazine.

## 2018-12-26 NOTE — CONSULTS
Patient lives with spouse, is independent and drives.  She will return home at [place] and follow up with Dr Agustin.

## 2018-12-26 NOTE — ASSESSMENT & PLAN NOTE
Cr at baseline. Will continue to monitor in setting of ongoing diuresis. Renal dose medications. Avoid nephrotoxic drugs and repeat labs in AM

## 2018-12-26 NOTE — HOSPITAL COURSE
Lakesha HOWARD Salomón 78 y.o. who was admitted to observation for acute diastolic heart failure. In the ED, troponin 0.034, , chest x-ray with congestion, intially HTN to 210, but decreased with PO hydralazine. Cardiology consulted. Patient was started on IV lasix with good response. IO not accurate in epic. NST negative for ischemia. 2 D echo EF 55%, indeterminate diastolic function, mod AS, mild/mod MR. Patient reports breathing at baseline on discharge. Patient stable for discharge home with family.Discharge home on lasix 20 mg BID and close follow up with Cardiologist Dr. Sharpe/Dr. Torres at [facility].

## 2018-12-28 NOTE — DISCHARGE SUMMARY
[facility] Hospital Medicine  Discharge Summary      Patient Name: Lakesha Lorenzana  MRN: [id number]  Admission Date: 12/25/2018  Hospital Length of Stay: 0 days  Discharge Date and Time:  12/26/18  Attending Physician: No att. providers found   Discharging Provider: Jess Arguello NP  Primary Care Provider: Anila Agustin MD      HPI:   Lakesha Lorenzana 78 y.o. with HLD, DM2, and HTN presents to the hospital with a chief complaint of SOB. She reports beginning Sunday she has begun to experience SOB worsened with laying flat and worst at night. For the last few days she has been having to sleep upright in her recliner to breathe. She reports she also began to notice both legs swelling at this time. Her symptoms improved with IV lasix in the ED. She denies history of CHF. States she follows with Dr. Torres for cardiology and has stress tests/echo in the past within normal limit. She endorses SOB, orthopnea, and lower extremity edema. She denies fever, chest pain, cough, N/V/D, abdominal pain, dysuria, syncope, dizziness/lightheadedness.     In the ED, troponin 0.034, , chest x-ray with congestion, intially HTN to 210, but decreased with PO hydralazine.     * No surgery found *      Hospital Course:   Lakesha Lorenzana 78 y.o. who was admitted to observation for acute diastolic heart failure. In the ED, troponin 0.034, , chest x-ray with congestion, intially HTN to 210, but decreased with PO hydralazine. Cardiology consulted. Patient was started on IV lasix with good response. IO not accurate in epic. NST negative for ischemia. 2 D echo EF 55%, indeterminate diastolic function, mod AS, mild/mod MR. Patient reports breathing at baseline on discharge. Patient stable for discharge home with family.Discharge home on lasix 20 mg BID and close follow up with Cardiologist Dr. Sharpe/Dr. Torres at [facility].      Consults:   Consults (From admission, onward)        Status  Ordering Provider     IP consult to case management  Once     Provider:  (Not yet assigned)    Completed CARMEN ORDOÑEZ          No new Assessment & Plan notes have been filed under this hospital service since the last note was generated.  Service: Hospital Medicine    Final Active Diagnoses:    Diagnosis Date Noted POA    PRINCIPAL PROBLEM:  Acute on chronic congestive heart failure [I50.9] 12/25/2018 Yes    Elevated troponin [R74.8] 12/26/2018 Unknown    CKD (chronic kidney disease), stage II [N18.2] 05/02/2017 Yes    Non morbid obesity due to excess calories [E66.09] 02/02/2017 Yes    Type 2 diabetes mellitus with stage 2 chronic kidney disease, without long-term current use of insulin [E11.22, N18.2] 01/28/2016 Yes    Systolic ejection murmur [R01.1] 01/22/2015 Yes    Essential hypertension, benign [I10] 10/15/2012 Yes    PVD (peripheral vascular disease) [I73.9] 10/15/2012 Yes    Dyslipidemia [E78.5] 10/15/2012 Yes      Problems Resolved During this Admission:       Discharged Condition: good    Disposition: Home or Self Care    Follow Up:  Follow-up Information     Jf Torres MD. Schedule an appointment as soon as possible for a visit in 3 days.    Specialty:  Internal Medicine  Contact information:  [address] [place] [address]  [facility] [address] [phone]                 Patient Instructions:      COMPREHENSIVE METABOLIC PANEL   Standing Status: Future Standing Exp. Date: 02/24/20     Diet Cardiac     Diet diabetic     Activity as tolerated         Pending Diagnostic Studies:     None         Medications:  Reconciled Home Medications:      Medication List      START taking these medications    furosemide 20 MG tablet  Commonly known as:  LASIX  Take 1 tablet (20 mg total) by mouth 2 (two) times daily.        CHANGE how you take these medications    glimepiride 4 MG tablet  Commonly known as:  AMARYL  Take 1 tablet (4 mg total) by mouth 2 (two) times daily. 1 tablet  with breakfast, half a tablet every evening  What changed:    · how much to take  · when to take this  · additional instructions        CONTINUE taking these medications    amLODIPine 10 MG tablet  Commonly known as:  NORVASC  Take 1 tablet (10 mg total) by mouth once daily.     aspirin 81 MG EC tablet  Commonly known as:  ECOTRIN  Take 81 mg by mouth once daily. Instructed to stop 7 days prior to surgery per Dr. Hager     atorvastatin 10 MG tablet  Commonly known as:  LIPITOR  Take 1 tablet (10 mg total) by mouth once daily.     calcium citrate-vitamin D3 315-250 mg-unit Tab  Commonly known as:  CITRACAL + D MAXIMUM  Take 315 mg by mouth 2 (two) times daily.     doxazosin 2 MG tablet  Commonly known as:  CARDURA  Take 1 tablet (2 mg total) by mouth once daily.     ergocalciferol 50,000 unit Cap  Commonly known as:  ERGOCALCIFEROL  Take 50,000 Units by mouth every 30 days.     GLUCOPHAGE 1000 MG tablet  Generic drug:  metFORMIN  Take 1,000 mg by mouth 2 (two) times daily with meals.     lisinopril 40 MG tablet  Commonly known as:  PRINIVIL,ZESTRIL  Take 1 tablet (40 mg total) by mouth once daily.     oxyCODONE-acetaminophen 5-325 mg per tablet  Commonly known as:  PERCOCET  Take 1-2 every 4-6 hours     SITagliptan-metformin 50-1,000 mg per tablet  Commonly known as:  JANUMET  Take 1 tablet by mouth 2 (two) times daily with meals.     TRADJENTA 5 mg Tab tablet  Generic drug:  linagliptin  Take 5 mg by mouth once daily.            Indwelling Lines/Drains at time of discharge:   Lines/Drains/Airways     Drain                 Drain/Device  06/29/18 0819 Left lateral hip other (see comments) 181 days          Airway                 Airway - Non-Surgical 06/29/18 0640 Nasal Cannula 181 days                Time spent on the discharge of patient: 30 minutes  Patient was seen and examined on the date of discharge and determined to be suitable for discharge.         Jess Arguello NP  [facility]  Medicine  [facility]

## 2019-08-29 ENCOUNTER — HOSPITAL ENCOUNTER (OUTPATIENT)
Dept: RADIOLOGY | Facility: HOSPITAL | Age: 79
Discharge: HOME | End: 2019-08-29
Attending: INTERNAL MEDICINE
Payer: MEDICARE

## 2019-08-29 DIAGNOSIS — Z95.2 S/P TAVR (TRANSCATHETER AORTIC VALVE REPLACEMENT): ICD-10-CM

## 2019-08-29 DIAGNOSIS — R06.02 SHORTNESS OF BREATH: ICD-10-CM

## 2019-08-29 DIAGNOSIS — R06.02 SHORTNESS OF BREATH: Primary | ICD-10-CM

## 2019-08-29 PROCEDURE — 71046 X-RAY EXAM CHEST 2 VIEWS: CPT | Mod: TC,FY

## 2019-08-29 PROCEDURE — 71046 XR CHEST PA AND LATERAL: ICD-10-PCS | Mod: 26,,, | Performed by: RADIOLOGY

## 2019-08-29 PROCEDURE — 71046 X-RAY EXAM CHEST 2 VIEWS: CPT | Mod: 26,,, | Performed by: RADIOLOGY

## 2019-10-06 ENCOUNTER — OFFICE VISIT (OUTPATIENT)
Dept: URGENT CARE | Facility: CLINIC | Age: 79
End: 2019-10-06
Payer: MEDICARE

## 2019-10-06 VITALS
BODY MASS INDEX: 40.48 KG/M2 | OXYGEN SATURATION: 95 % | DIASTOLIC BLOOD PRESSURE: 59 MMHG | HEIGHT: 65 IN | WEIGHT: 243 LBS | HEART RATE: 93 BPM | RESPIRATION RATE: 18 BRPM | TEMPERATURE: 98 F | SYSTOLIC BLOOD PRESSURE: 117 MMHG

## 2019-10-06 DIAGNOSIS — L03.116 CELLULITIS OF LEFT LOWER LEG: Primary | ICD-10-CM

## 2019-10-06 PROCEDURE — 99499 RISK ADDL DX/OHS AUDIT: ICD-10-PCS | Mod: S$GLB,,, | Performed by: NURSE PRACTITIONER

## 2019-10-06 PROCEDURE — 99214 PR OFFICE/OUTPT VISIT, EST, LEVL IV, 30-39 MIN: ICD-10-PCS | Mod: S$GLB,,, | Performed by: NURSE PRACTITIONER

## 2019-10-06 PROCEDURE — 3078F PR MOST RECENT DIASTOLIC BLOOD PRESSURE < 80 MM HG: ICD-10-PCS | Mod: CPTII,S$GLB,, | Performed by: NURSE PRACTITIONER

## 2019-10-06 PROCEDURE — 99214 OFFICE O/P EST MOD 30 MIN: CPT | Mod: S$GLB,,, | Performed by: NURSE PRACTITIONER

## 2019-10-06 PROCEDURE — 3074F SYST BP LT 130 MM HG: CPT | Mod: CPTII,S$GLB,, | Performed by: NURSE PRACTITIONER

## 2019-10-06 PROCEDURE — 3078F DIAST BP <80 MM HG: CPT | Mod: CPTII,S$GLB,, | Performed by: NURSE PRACTITIONER

## 2019-10-06 PROCEDURE — 3074F PR MOST RECENT SYSTOLIC BLOOD PRESSURE < 130 MM HG: ICD-10-PCS | Mod: CPTII,S$GLB,, | Performed by: NURSE PRACTITIONER

## 2019-10-06 PROCEDURE — 99499 UNLISTED E&M SERVICE: CPT | Mod: S$GLB,,, | Performed by: NURSE PRACTITIONER

## 2019-10-06 RX ORDER — CEPHALEXIN 500 MG/1
500 CAPSULE ORAL 4 TIMES DAILY
Qty: 40 CAPSULE | Refills: 0 | Status: SHIPPED | OUTPATIENT
Start: 2019-10-06 | End: 2019-10-16

## 2019-10-06 NOTE — PATIENT INSTRUCTIONS
General Discharge Instructions   If you were prescribed a narcotic or controlled medication, do not drive or operate heavy equipment or machinery while taking these medications.  If you were prescribed antibiotics, please take them to completion.  You must understand that you've received an Urgent Care treatment only and that you may be released before all your medical problems are known or treated. You, the patient, will arrange for follow up care as instructed.  Follow up with your PCP or specialty clinic as directed in the next 1-2 weeks if not improved or as needed.  You can call [phone] to schedule an appointment with the appropriate provider.  If your condition worsens we recommend that you receive another evaluation at the emergency room immediately or contact your primary medical clinics after hours call service to discuss your concerns.  Please return here or go to the Emergency Department for any concerns or worsening of condition.    STAY OF OF LEFT LEG AND KEEP ELEVATED.  MONITOR FOR FEVER AND INCREASED/ WORSENING SYMPTOMS  GO TO ER IF SYMPTOMS WORSEN

## 2019-10-06 NOTE — PROGRESS NOTES
"Subjective:       Patient ID: Lakesha Lorenzana is a 79 y.o. female.    Vitals:  height is 5' 5" (1.651 m) and weight is 110.2 kg (243 lb). Her temperature is 98.2 °F (36.8 °C). Her blood pressure is 117/59 (abnormal) and her pulse is 93. Her respiration is 18 and oxygen saturation is 95%.     Chief Complaint: Recurrent Skin Infections    Reports right leg pain for about 7 days. Patient states that it worsened on Friday after going to the grocery store. She denies fever, chills, chest pain, palpitations, and shortness of breath. She states that this has occurred before.     Leg Pain    Incident onset: 7 days. The incident occurred at home. There was no injury mechanism. The pain is present in the right leg. The pain is at a severity of 0/10. The patient is experiencing no pain. She reports no foreign bodies present. The symptoms are aggravated by palpation. She has tried nothing for the symptoms. The treatment provided no relief.       Constitution: Negative for chills and fever.   HENT: Negative for facial swelling and sore throat.    Neck: Negative for painful lymph nodes.   Eyes: Negative for eye itching and eyelid swelling.   Respiratory: Negative for cough.    Musculoskeletal: Negative for joint pain and joint swelling.   Skin: Positive for color change, rash and erythema. Negative for pale, wound, abrasion, laceration, lesion, skin thickening/induration, puncture wound, bruising, abscess, avulsion and hives.   Allergic/Immunologic: Positive for itching. Negative for environmental allergies, immunocompromised state and hives.   Hematologic/Lymphatic: Negative for swollen lymph nodes.       Objective:      Physical Exam   Constitutional: She is oriented to person, place, and time. She appears well-developed and well-nourished.   HENT:   Head: Normocephalic and atraumatic.   Right Ear: Hearing and external ear normal.   Left Ear: Hearing and external ear normal.   Nose: Nose normal.   Mouth/Throat: Oropharynx is " clear and moist.   Eyes: Conjunctivae are normal.   Neck: Normal range of motion.   Cardiovascular: Normal rate, regular rhythm, normal heart sounds and normal pulses.   Pulses:       Radial pulses are 2+ on the right side, and 2+ on the left side.        Dorsalis pedis pulses are 2+ on the right side, and 2+ on the left side.        Posterior tibial pulses are 2+ on the right side, and 2+ on the left side.   Pulmonary/Chest: Effort normal and breath sounds normal. No stridor. No respiratory distress. She has no decreased breath sounds. She has no wheezes. She has no rales. She exhibits no tenderness.   Abdominal: Normal appearance.   Musculoskeletal: Normal range of motion.        Right lower leg: She exhibits tenderness and edema.        Legs:  Erythema     Vladimir's sign negative    No decrease in range of motion    Patient drove herself to clinic   Neurological: She is alert and oriented to person, place, and time.   Skin: Skin is warm. Capillary refill takes less than 2 seconds. No rash noted. There is erythema.   Itching   Nursing note and vitals reviewed.      Assessment:       1. Cellulitis of left lower leg        Plan:     ER precautions given. Advised to follow up if no improvement. Discussed if no improvement there may be a need for IV antibiotics.     Cellulitis of left lower leg  -     cephALEXin (KEFLEX) 500 MG capsule; Take 1 capsule (500 mg total) by mouth 4 (four) times daily. for 10 days  Dispense: 40 capsule; Refill: 0      Patient Instructions   General Discharge Instructions   If you were prescribed a narcotic or controlled medication, do not drive or operate heavy equipment or machinery while taking these medications.  If you were prescribed antibiotics, please take them to completion.  You must understand that you've received an Urgent Care treatment only and that you may be released before all your medical problems are known or treated. You, the patient, will arrange for follow up care as  instructed.  Follow up with your PCP or specialty clinic as directed in the next 1-2 weeks if not improved or as needed.  You can call [phone] to schedule an appointment with the appropriate provider.  If your condition worsens we recommend that you receive another evaluation at the emergency room immediately or contact your primary medical clinics after hours call service to discuss your concerns.  Please return here or go to the Emergency Department for any concerns or worsening of condition.    STAY OF OF LEFT LEG AND KEEP ELEVATED.  MONITOR FOR FEVER AND INCREASED/ WORSENING SYMPTOMS  GO TO ER IF SYMPTOMS WORSEN

## 2019-10-09 NOTE — PROGRESS NOTES
Patient, Lakesha Lorenzana (MRN #[id number]), presented with a recorded BMI of 40.44 kg/m^2 consistent with the definition of morbid obesity (ICD-10 E66.01). The patient's morbid obesity was monitored, evaluated, addressed and/or treated. This addendum to the medical record is made on 10/08/2019.

## 2021-04-12 ENCOUNTER — PATIENT MESSAGE (OUTPATIENT)
Dept: RESEARCH | Facility: HOSPITAL | Age: 81
End: 2021-04-12

## 2023-06-01 NOTE — NURSING
Received to room 361 via pts own bed from PACU. AAOx4, resp even and unlabored on room air. Jamil Dressing to left hip CDI, no drainage noted. Abductor pillow in use to BLE's. MARITA hose on, SCD's started. Denies any pain at this time. Tolerating clear liquids, will advance diet. Red patent and draining.  Family at bedside. Oriented to room and call system. Bed in lowest locked position, side rails elevated x2, cb in reach. Will cont to monitor and do purposeful rounding every hour.    A-T Advancement Flap Text: The defect edges were debeveled with a #15 scalpel blade.  Given the location of the defect, shape of the defect and the proximity to free margins an A-T advancement flap was deemed most appropriate.  Using a sterile surgical marker, an appropriate advancement flap was drawn incorporating the defect and placing the expected incisions within the relaxed skin tension lines where possible.    The area thus outlined was incised deep to adipose tissue with a #15 scalpel blade.  The skin margins were undermined to an appropriate distance in all directions utilizing iris scissors.

## (undated) DEVICE — DRESSING AQUACEL ADH 4X10IN

## (undated) DEVICE — Device

## (undated) DEVICE — NDL SAFETY 22G X 1.5 ECLIPSE

## (undated) DEVICE — UNDERGLOVES BIOGEL PI SIZE 7.5

## (undated) DEVICE — DRESSING LEUKOPLAST FLEX 1X3IN

## (undated) DEVICE — GLOVE BIOGEL SKINSENSE PI 7.0

## (undated) DEVICE — COVER MAYO STAND REINFRCD 30

## (undated) DEVICE — KIT IRR SUCTION HND PIECE

## (undated) DEVICE — DRESSING TRANS 4X10 TEGADERM

## (undated) DEVICE — ELECTRODE REM PLYHSV RETURN 9

## (undated) DEVICE — POSITIONER IV ARMBOARD FOAM

## (undated) DEVICE — SOL IRR NACL .9% 3000ML

## (undated) DEVICE — SOL 9P NACL IRR PIC IL

## (undated) DEVICE — GLOVE BIOGEL SKINSENSE PI 8.5

## (undated) DEVICE — SUT STRATAFIX PDS 1 CTX 18IN

## (undated) DEVICE — DRESSING AQUACEL AG 3.5X10IN

## (undated) DEVICE — SEE MEDLINE ITEM 161288

## (undated) DEVICE — SEE MEDLINE ITEM 153151

## (undated) DEVICE — DRESSING PICO 4X12 ACT SZ 2X10

## (undated) DEVICE — UNDERGLOVES BIOGEL PI SZ 7 LF

## (undated) DEVICE — SPONGE GAUZE 16PLY 4X4

## (undated) DEVICE — APPLICATOR CHLORAPREP ORN 26ML

## (undated) DEVICE — UNDERGLOVE BIOGEL PI SZ 6.5 LF

## (undated) DEVICE — SUT ETHIBOND EXCEL 5-0 V-40

## (undated) DEVICE — UNDERGLOVES BIOGEL PI SIZE 8.5

## (undated) DEVICE — HOOD T-5 TEAR AWAY STERILE

## (undated) DEVICE — ALCOHOL 70% ISOP W/GREEN 16OZ

## (undated) DEVICE — DRAPE CAMERA/VIDEO 5 X 96

## (undated) DEVICE — SEE MEDLINE ITEM 157117

## (undated) DEVICE — DRAPE INCISE IOBAN 2 23X17IN

## (undated) DEVICE — SUT VICRYL+ 1 CTX 18IN VIOL

## (undated) DEVICE — DRAPE STERI U-SHAPED 47X51IN

## (undated) DEVICE — SHEET HIP ABSORB CIRC ELAS 8

## (undated) DEVICE — CONTAINER SPECIMEN STRL 4OZ

## (undated) DEVICE — DRAPE STERI INSTRUMENT 1018

## (undated) DEVICE — SYR 30CC LUER LOCK

## (undated) DEVICE — STAPLER SKIN PROXIMATE WIDE

## (undated) DEVICE — TRAY FOLEY 16FR INFECTION CONT

## (undated) DEVICE — COVER BACK TABLE 72X21

## (undated) DEVICE — SEE MEDLINE ITEM 157131

## (undated) DEVICE — BLADE SAG DUAL 18MMX1.27MMX90M

## (undated) DEVICE — SUT VICRYL PLUS 2-0 CT1 18